# Patient Record
Sex: FEMALE | Race: WHITE | NOT HISPANIC OR LATINO | Employment: OTHER | URBAN - METROPOLITAN AREA
[De-identification: names, ages, dates, MRNs, and addresses within clinical notes are randomized per-mention and may not be internally consistent; named-entity substitution may affect disease eponyms.]

---

## 2017-09-21 ENCOUNTER — OFFICE VISIT (OUTPATIENT)
Dept: NEPHROLOGY | Facility: CLINIC | Age: 62
End: 2017-09-21
Payer: MEDICAID

## 2017-09-21 VITALS
SYSTOLIC BLOOD PRESSURE: 140 MMHG | HEART RATE: 77 BPM | OXYGEN SATURATION: 83 % | DIASTOLIC BLOOD PRESSURE: 94 MMHG | HEIGHT: 65 IN | WEIGHT: 108 LBS | BODY MASS INDEX: 17.99 KG/M2

## 2017-09-21 DIAGNOSIS — R79.89 ELEVATED SERUM CREATININE: Primary | ICD-10-CM

## 2017-09-21 DIAGNOSIS — E87.5 HYPERKALEMIA: ICD-10-CM

## 2017-09-21 DIAGNOSIS — R79.81: ICD-10-CM

## 2017-09-21 PROCEDURE — 99999 PR PBB SHADOW E&M-EST. PATIENT-LVL III: CPT | Mod: PBBFAC,,, | Performed by: INTERNAL MEDICINE

## 2017-09-21 PROCEDURE — 3008F BODY MASS INDEX DOCD: CPT | Mod: ,,, | Performed by: INTERNAL MEDICINE

## 2017-09-21 PROCEDURE — 99213 OFFICE O/P EST LOW 20 MIN: CPT | Mod: PBBFAC | Performed by: INTERNAL MEDICINE

## 2017-09-21 PROCEDURE — 99205 OFFICE O/P NEW HI 60 MIN: CPT | Mod: S$PBB,,, | Performed by: INTERNAL MEDICINE

## 2017-09-21 NOTE — LETTER
September 21, 2017      Will Monaco MD  [address] [place] [address]           [facility] - Nephrology  [address]  [facility] [address]  Phone: [phone]  Fax: [phone]          Patient: Sonya Heredia   MR Number: [id number]   YOB: 1955   Date of Visit: 9/21/2017       Dear Dr. Will Monaco:    Thank you for referring Sonya Heredia to me for evaluation. Attached you will find relevant portions of my assessment and plan of care.    If you have questions, please do not hesitate to call me. I look forward to following Sonya Heredia along with you.    Sincerely,    Hossein Gregory MD    Enclosure  CC:  No Recipients    If you would like to receive this communication electronically, please contact [email or web address] or [phone] to request more information on [employer] access.    For providers and/or their staff who would like to refer a patient to [facility], please contact us through our one-stop-shop provider referral line, [facility], at [phone].    If you feel you have received this communication in error or would no longer like to receive these types of communications, please e-mail [email or web address]

## 2017-09-21 NOTE — PROGRESS NOTES
New Consultation Report  Nephrology      Consult Requested By: Dr. Monaco, PCP  Reason for Consult: CKD    History of Present Illness:  Patient is a 62 y.o. female presents for a new consultation for evaluation of CKD. She has a PMH s/f COPD, HPL and HTN. No hx of diabetes. According to [facility] records, her kidney function was normal 3 years ago. During recent visits with her PCP over the last 4 months, she has had numerous lab reports revealing a serum Cr fluctuating between 1.0 and 1.5 mg/dL. She has also been found to have hyperkalemia and high total CO2. She denies edema, dark urine, foamy urine or urinary disturbance. She has chronic cough and chronic dyspnea from her COPD. She admits taking Aleve and Goody powders for pain.     The patient denies taking new antibiotics, recreational drugs, recent episode of dehydration, diarrhea, nausea or vomiting, acute illness, hospitalization or exposure to IV radiocontrast.     Past Medical History:   Diagnosis Date    Bronchitis     COPD (chronic obstructive pulmonary disease)     Depression     Hyperlipidemia     Hypertension          Current Outpatient Prescriptions:     alprazolam (XANAX) 0.5 MG tablet, Take 0.5 mg by mouth 3 (three) times daily., Disp: , Rfl:     aspirin (ECOTRIN) 325 MG EC tablet, Take 325 mg by mouth once daily., Disp: , Rfl:     escitalopram oxalate (LEXAPRO) 20 MG tablet, Take 20 mg by mouth once daily., Disp: , Rfl:     fluoxetine (PROZAC) 40 MG capsule, Take 40 mg by mouth once daily., Disp: , Rfl:     hydrocodone-acetaminophen 5-325mg (NORCO) 5-325 mg per tablet, Take 1 tablet by mouth every 8 (eight) hours as needed for Pain., Disp: 6 tablet, Rfl: 0    lorazepam (ATIVAN) 1 MG tablet, Take 1 tablet (1 mg total) by mouth once daily., Disp: 10 tablet, Rfl: 0    omeprazole (PRILOSEC) 40 MG capsule, Take 40 mg by mouth once daily., Disp: , Rfl:     pravastatin (PRAVACHOL) 40 MG tablet, Take 40 mg by mouth once daily., Disp: , Rfl:      Review of patient's allergies indicates:  No Known Allergies     Past Surgical History:   Procedure Laterality Date    CHOLECYSTECTOMY      OVARIAN CYST REMOVAL       No family history on file.  Social History   Substance Use Topics    Smoking status: Current Every Day Smoker     Types: Cigarettes    Smokeless tobacco: Not on file    Alcohol use Yes       Review of Systems   Constitutional: Negative.    HENT: Negative.    Eyes: Negative.    Respiratory: Positive for cough, shortness of breath and wheezing.    Cardiovascular: Negative.    Gastrointestinal: Negative.    Genitourinary: Negative.    Musculoskeletal: Negative.    Skin: Negative.    Neurological: Negative.    All other systems reviewed and are negative.      Vitals:    09/21/17 0947   BP: (!) 140/94   Pulse: 77       PHYSICAL EXAMINATION:  General: no distress, well nourished  Skin: color, texture, turgor normal. No rash or lesions  HEENT: Head: normocephalic. Ear/Nose: normal. Eyes: reactive pupils, normal conjunctiva. Oral mucosa moist, no ulcers. Throat: no erythema.  Neck: supple, symmetrical, trachea midline, no JVD, no carotid bruit  Lungs: clear to auscultation bilaterally and normal respiratory effort, distant.   Cardiovascular: Heart: regular rate and rhythm, S1, S2 normal, no murmur, rub or gallop. Pulses: 2+ and symmetric.  Abdomen: bowel sounds present, no abdominal bruit, soft, non-tender non-distented; no masses, incisional scar   Musculoskeletal: no pitting edema in lower extremities, no clubbing or cyanosis  Lymph Nodes: No cervical or supraclavicular adenopathy  Neurologic: AAOx3, normal strength and tone. No focal deficit. No asterixis.       LABORATORY DATA:  Lab Results   Component Value Date    CREATININE 0.7 11/01/2014     No urine studies available    Lab Results   Component Value Date     11/01/2014    K 3.7 11/01/2014    CO2 24 11/01/2014       last PTH   Lab Results   Component Value Date    CALCIUM 8.0 (L)  11/01/2014    PHOS 4.4 10/29/2014       Lab Results   Component Value Date    HGB 11.5 (L) 11/01/2014        Lab Results   Component Value Date    HGBA1C 5.0 10/06/2011       Lab Results   Component Value Date    LDLCALC 107.8 10/06/2011         IMPRESSION / RECOMMENDATIONS:     1. Elevated serum creatinine  Etiology undetermined. CKD vs intermittent JOSHUA from ACEI or NSAID use. May have CKD from HTN nephropathy or renovascular dz given smoking hx. BP is marginally elevated off ACEI. Will reassess after w/u is completed. Will investigate further with following w/u:  Renal US Retroperitoneal Complete + Dopplers    Comprehensive metabolic panel    Uric acid    Urinalysis    Protein / creatinine ratio, urine    CBC auto differential   2. Serum carbon dioxide increased  Likely due to chronic respiratory acidosis from COPD   3. Hyperkalemia  Improved after discontinuation of ACEI.       SUMMARY OF PLAN:  1. Stop all NSAIDs  2. Stay off ACEi  3. Increase fluid intake  4. Renal US + Renal Dopplers  5. UA + UPCR  6. Monitor home BP  7. RTC in 3 mo

## 2018-01-17 ENCOUNTER — NURSE TRIAGE (OUTPATIENT)
Dept: ADMINISTRATIVE | Facility: CLINIC | Age: 63
End: 2018-01-17

## 2018-01-18 NOTE — TELEPHONE ENCOUNTER
"  Reason for Disposition   Health Information question, no triage required and triager able to answer question    Answer Assessment - Initial Assessment Questions  1. REASON FOR CALL or QUESTION: "What is your reason for calling today?" or "How can I best help you?" or "What question do you have that I can help answer?"      Pt's daughter called very concerned because pt has very red swollen feet x 1 week, and family wants her to go to the Ed, but she is refusing.  Also states mother has some sort of blockage in her blood vessels and some problem with her kidneys.    Protocols used: ST INFORMATION ONLY CALL-A-AH    "

## 2018-02-05 ENCOUNTER — HOSPITAL ENCOUNTER (INPATIENT)
Facility: HOSPITAL | Age: 63
LOS: 3 days | Discharge: HOME | DRG: 291 | End: 2018-02-08
Attending: EMERGENCY MEDICINE | Admitting: HOSPITALIST
Payer: MEDICAID

## 2018-02-05 DIAGNOSIS — J96.01 ACUTE RESPIRATORY FAILURE WITH HYPOXIA: ICD-10-CM

## 2018-02-05 DIAGNOSIS — I50.9 NEW ONSET OF CONGESTIVE HEART FAILURE: Primary | ICD-10-CM

## 2018-02-05 DIAGNOSIS — I50.9 HEART FAILURE: ICD-10-CM

## 2018-02-05 DIAGNOSIS — R09.02 HYPOXIA: ICD-10-CM

## 2018-02-05 DIAGNOSIS — J44.1 COPD WITH ACUTE EXACERBATION: ICD-10-CM

## 2018-02-05 DIAGNOSIS — I10 HTN (HYPERTENSION): ICD-10-CM

## 2018-02-05 DIAGNOSIS — R23.0 PERIPHERAL CYANOSIS: ICD-10-CM

## 2018-02-05 PROBLEM — L97.921: Status: ACTIVE | Noted: 2018-02-05

## 2018-02-05 PROBLEM — D58.2 ELEVATED HEMOGLOBIN: Status: ACTIVE | Noted: 2018-02-05

## 2018-02-05 PROBLEM — Z72.0 NICOTINE ABUSE: Status: ACTIVE | Noted: 2018-02-05

## 2018-02-05 PROBLEM — F41.9 ANXIETY: Status: ACTIVE | Noted: 2018-02-05

## 2018-02-05 LAB
ALBUMIN SERPL BCP-MCNC: 3 G/DL
ALP SERPL-CCNC: 76 U/L
ALT SERPL W/O P-5'-P-CCNC: 16 U/L
ANION GAP SERPL CALC-SCNC: 8 MMOL/L
AST SERPL-CCNC: 25 U/L
BASOPHILS # BLD AUTO: 0.05 K/UL
BASOPHILS NFR BLD: 0.8 %
BILIRUB SERPL-MCNC: 0.7 MG/DL
BNP SERPL-MCNC: 1391 PG/ML
BUN SERPL-MCNC: 21 MG/DL
CALCIUM SERPL-MCNC: 9 MG/DL
CHLORIDE SERPL-SCNC: 93 MMOL/L
CO2 SERPL-SCNC: 33 MMOL/L
CREAT SERPL-MCNC: 0.8 MG/DL
DIFFERENTIAL METHOD: ABNORMAL
EOSINOPHIL # BLD AUTO: 0.1 K/UL
EOSINOPHIL NFR BLD: 0.9 %
ERYTHROCYTE [DISTWIDTH] IN BLOOD BY AUTOMATED COUNT: 20.1 %
EST. GFR  (AFRICAN AMERICAN): >60 ML/MIN/1.73 M^2
EST. GFR  (NON AFRICAN AMERICAN): >60 ML/MIN/1.73 M^2
GLUCOSE SERPL-MCNC: 105 MG/DL
HCT VFR BLD AUTO: 54.4 %
HGB BLD-MCNC: 16.6 G/DL
IMM GRANULOCYTES # BLD AUTO: 0.01 K/UL
IMM GRANULOCYTES NFR BLD AUTO: 0.2 %
INR PPP: 1.2
LYMPHOCYTES # BLD AUTO: 0.8 K/UL
LYMPHOCYTES NFR BLD: 11.8 %
MCH RBC QN AUTO: 25.7 PG
MCHC RBC AUTO-ENTMCNC: 30.5 G/DL
MCV RBC AUTO: 84 FL
MONOCYTES # BLD AUTO: 0.6 K/UL
MONOCYTES NFR BLD: 8.6 %
NEUTROPHILS # BLD AUTO: 5.1 K/UL
NEUTROPHILS NFR BLD: 77.7 %
NRBC BLD-RTO: 0 /100 WBC
PLATELET # BLD AUTO: 173 K/UL
PMV BLD AUTO: 9.7 FL
POTASSIUM SERPL-SCNC: 4.5 MMOL/L
PROT SERPL-MCNC: 6.5 G/DL
PROTHROMBIN TIME: 12 SEC
RBC # BLD AUTO: 6.46 M/UL
SODIUM SERPL-SCNC: 134 MMOL/L
TROPONIN I SERPL DL<=0.01 NG/ML-MCNC: 0.06 NG/ML
WBC # BLD AUTO: 6.61 K/UL

## 2018-02-05 PROCEDURE — 25000003 PHARM REV CODE 250: Performed by: HOSPITALIST

## 2018-02-05 PROCEDURE — 99222 1ST HOSP IP/OBS MODERATE 55: CPT | Mod: ,,, | Performed by: HOSPITALIST

## 2018-02-05 PROCEDURE — 25000003 PHARM REV CODE 250

## 2018-02-05 PROCEDURE — S4991 NICOTINE PATCH NONLEGEND: HCPCS

## 2018-02-05 PROCEDURE — A4216 STERILE WATER/SALINE, 10 ML: HCPCS | Performed by: HOSPITALIST

## 2018-02-05 PROCEDURE — 93005 ELECTROCARDIOGRAM TRACING: CPT

## 2018-02-05 PROCEDURE — 99285 EMERGENCY DEPT VISIT HI MDM: CPT | Mod: 25

## 2018-02-05 PROCEDURE — 99284 EMERGENCY DEPT VISIT MOD MDM: CPT | Mod: ,,, | Performed by: EMERGENCY MEDICINE

## 2018-02-05 PROCEDURE — 63600175 PHARM REV CODE 636 W HCPCS

## 2018-02-05 PROCEDURE — 99900035 HC TECH TIME PER 15 MIN (STAT)

## 2018-02-05 PROCEDURE — 93010 ELECTROCARDIOGRAM REPORT: CPT | Mod: ,,, | Performed by: INTERNAL MEDICINE

## 2018-02-05 PROCEDURE — 83880 ASSAY OF NATRIURETIC PEPTIDE: CPT

## 2018-02-05 PROCEDURE — 85025 COMPLETE CBC W/AUTO DIFF WBC: CPT

## 2018-02-05 PROCEDURE — 80053 COMPREHEN METABOLIC PANEL: CPT

## 2018-02-05 PROCEDURE — 84484 ASSAY OF TROPONIN QUANT: CPT

## 2018-02-05 PROCEDURE — 20600001 HC STEP DOWN PRIVATE ROOM

## 2018-02-05 PROCEDURE — 85610 PROTHROMBIN TIME: CPT

## 2018-02-05 PROCEDURE — 96374 THER/PROPH/DIAG INJ IV PUSH: CPT

## 2018-02-05 RX ORDER — ESCITALOPRAM OXALATE 20 MG/1
20 TABLET ORAL DAILY
Status: DISCONTINUED | OUTPATIENT
Start: 2018-02-06 | End: 2018-02-08 | Stop reason: HOSPADM

## 2018-02-05 RX ORDER — IPRATROPIUM BROMIDE AND ALBUTEROL SULFATE 2.5; .5 MG/3ML; MG/3ML
3 SOLUTION RESPIRATORY (INHALATION)
Status: DISPENSED | OUTPATIENT
Start: 2018-02-05 | End: 2018-02-05

## 2018-02-05 RX ORDER — FUROSEMIDE 10 MG/ML
20 INJECTION INTRAMUSCULAR; INTRAVENOUS 2 TIMES DAILY
Status: DISCONTINUED | OUTPATIENT
Start: 2018-02-06 | End: 2018-02-07

## 2018-02-05 RX ORDER — ALBUTEROL SULFATE 0.83 MG/ML
2.5 SOLUTION RESPIRATORY (INHALATION) EVERY 6 HOURS PRN
COMMUNITY

## 2018-02-05 RX ORDER — PREDNISONE 20 MG/1
60 TABLET ORAL
Status: COMPLETED | OUTPATIENT
Start: 2018-02-05 | End: 2018-02-05

## 2018-02-05 RX ORDER — AMITRIPTYLINE HYDROCHLORIDE 25 MG/1
25 TABLET, FILM COATED ORAL NIGHTLY PRN
COMMUNITY
End: 2019-10-20

## 2018-02-05 RX ORDER — ALBUTEROL SULFATE 90 UG/1
2 AEROSOL, METERED RESPIRATORY (INHALATION) EVERY 6 HOURS PRN
COMMUNITY

## 2018-02-05 RX ORDER — FUROSEMIDE 10 MG/ML
20 INJECTION INTRAMUSCULAR; INTRAVENOUS
Status: COMPLETED | OUTPATIENT
Start: 2018-02-05 | End: 2018-02-05

## 2018-02-05 RX ORDER — SODIUM CHLORIDE 0.9 % (FLUSH) 0.9 %
3 SYRINGE (ML) INJECTION EVERY 8 HOURS
Status: DISCONTINUED | OUTPATIENT
Start: 2018-02-05 | End: 2018-02-08 | Stop reason: HOSPADM

## 2018-02-05 RX ORDER — ENOXAPARIN SODIUM 100 MG/ML
40 INJECTION SUBCUTANEOUS EVERY 24 HOURS
Status: DISCONTINUED | OUTPATIENT
Start: 2018-02-05 | End: 2018-02-08 | Stop reason: HOSPADM

## 2018-02-05 RX ORDER — IBUPROFEN 200 MG
1 TABLET ORAL
Status: COMPLETED | OUTPATIENT
Start: 2018-02-05 | End: 2018-02-06

## 2018-02-05 RX ORDER — BUTALBITAL, ACETAMINOPHEN AND CAFFEINE 300; 40; 50 MG/1; MG/1; MG/1
CAPSULE ORAL
COMMUNITY
End: 2018-02-05

## 2018-02-05 RX ORDER — AMITRIPTYLINE HYDROCHLORIDE 25 MG/1
25 TABLET, FILM COATED ORAL NIGHTLY PRN
Status: DISCONTINUED | OUTPATIENT
Start: 2018-02-05 | End: 2018-02-08 | Stop reason: HOSPADM

## 2018-02-05 RX ADMIN — PREDNISONE 60 MG: 20 TABLET ORAL at 05:02

## 2018-02-05 RX ADMIN — FUROSEMIDE 20 MG: 10 INJECTION, SOLUTION INTRAMUSCULAR; INTRAVENOUS at 06:02

## 2018-02-05 RX ADMIN — NICOTINE 1 PATCH: 21 PATCH, EXTENDED RELEASE TRANSDERMAL at 06:02

## 2018-02-05 RX ADMIN — ENOXAPARIN SODIUM 40 MG: 100 INJECTION SUBCUTANEOUS at 10:02

## 2018-02-05 RX ADMIN — Medication 3 ML: at 10:02

## 2018-02-05 NOTE — ED NOTES
Appearance:  Pt awake, alert & oriented to person, place & time.    Skin:  Skin warm, dry & intact.  Mucous membranes moist.  Skin turgor normal.  Respiratory:  Respirations shallow-pt denies SOB.   Neurologic:  Pt moving all extremities without difficulty.  Sensation intact.     Peripheral Vascular:  Bilateral lower extremity edema-3+ pitting.  (+) pulses via doppler  Abdomen:  Abdomen soft, non-tender to palpation.

## 2018-02-05 NOTE — ED PROVIDER NOTES
Encounter Date: 2/5/2018    SCRIBE #1 NOTE: I, Nirali Ingram, am scribing for, and in the presence of,  Dr. Chinchilla. I have scribed the following portions of the note - the Resident attestation.       History     Chief Complaint   Patient presents with    Leg Pain     both legs painful and swollen    Shortness of Breath     Ms. Heredia is a 63 yo woman with PMHx of COPD, little else known as care is primarily at  and patient has poor insight, who presents to the ED from her PCP's clinic due to worsening edema of her bilateral LE. However, on presentation patient was found to have O2 sats in the 60s on room air. Patient denied SOB. Sats improved to 100% on non-rebreather. Patient states her bilateral LE edema began 1 month ago. Associated with red-purple discoloration of her toes. Left foot with 2 areas of ulceration with drainage of clear fluid. Edema has progressively worsened to extend up to her knees. Denies symptoms of claudication, orthopnea, PND. Currently smokes 1/2ppd.          Review of patient's allergies indicates:  No Known Allergies  Past Medical History:   Diagnosis Date    Bronchitis     COPD (chronic obstructive pulmonary disease)     Depression     Hyperlipidemia     Hypertension      Past Surgical History:   Procedure Laterality Date    CHOLECYSTECTOMY      OVARIAN CYST REMOVAL       History reviewed. No pertinent family history.  Social History   Substance Use Topics    Smoking status: Current Every Day Smoker     Types: Cigarettes    Smokeless tobacco: Not on file    Alcohol use Yes     Review of Systems   Constitutional: Negative for fever.   HENT: Negative for sore throat.    Respiratory: Positive for shortness of breath. Negative for cough.    Cardiovascular: Positive for leg swelling. Negative for chest pain.   Gastrointestinal: Negative for nausea.   Genitourinary: Negative for dysuria.   Musculoskeletal: Negative for back pain.   Skin: Negative for rash.   Neurological:  Negative for weakness.   Hematological: Does not bruise/bleed easily.       Physical Exam     Initial Vitals [02/05/18 1635]   BP Pulse Resp Temp SpO2   (!) 173/99 (!) 115 (!) 42 98.2 °F (36.8 °C) (!) 66 %      MAP       123.67         Physical Exam    Nursing note and vitals reviewed.  Constitutional: She appears well-developed and well-nourished. She is not diaphoretic. No distress.   HENT:   Head: Normocephalic and atraumatic.   Eyes: EOM are normal. Pupils are equal, round, and reactive to light. Right eye exhibits no discharge. Left eye exhibits no discharge. No scleral icterus.   Neck: Normal range of motion. Neck supple. No JVD present.   Cardiovascular: Regular rhythm, normal heart sounds and intact distal pulses. Tachycardia present.  Exam reveals no gallop and no friction rub.    No murmur heard.  Pulmonary/Chest: She has wheezes. She has rhonchi.   Tachypnic, hypoxic, faint subcostal retractions, tight wheezing throughout, speaking 4-5 word sentences on RA   Abdominal: Soft. Bowel sounds are normal. She exhibits no distension and no mass. There is no tenderness. There is no rebound and no guarding.   Musculoskeletal: Normal range of motion. She exhibits no edema or tenderness.   Lymphadenopathy:     She has no cervical adenopathy.   Neurological: She is alert and oriented to person, place, and time. She has normal strength. No sensory deficit.   Skin: Skin is warm and dry. Capillary refill takes more than 3 seconds.   Peripheral cyanosis symmetric throughout toes with sluggish cap refill, brisk cap refill on fingers, dopplerable DP signals bilat   Psychiatric: She has a normal mood and affect.         ED Course   Procedures  Labs Reviewed   CBC W/ AUTO DIFFERENTIAL - Abnormal; Notable for the following:        Result Value    RBC 6.46 (*)     Hemoglobin 16.6 (*)     Hematocrit 54.4 (*)     MCH 25.7 (*)     MCHC 30.5 (*)     RDW 20.1 (*)     Lymph # 0.8 (*)     Gran% 77.7 (*)     Lymph% 11.8 (*)     All  other components within normal limits   COMPREHENSIVE METABOLIC PANEL - Abnormal; Notable for the following:     Sodium 134 (*)     Chloride 93 (*)     CO2 33 (*)     BUN, Bld 21 (*)     Albumin 3.0 (*)     All other components within normal limits   TROPONIN I - Abnormal; Notable for the following:     Troponin I 0.061 (*)     All other components within normal limits   B-TYPE NATRIURETIC PEPTIDE - Abnormal; Notable for the following:     BNP 1,391 (*)     All other components within normal limits   PROTIME-INR             Medical Decision Making:   History:   Old Medical Records: I decided to obtain old medical records.  Clinical Tests:   Lab Tests: Ordered and Reviewed  Radiological Study: Ordered and Reviewed  ED Management:  O2 improved to 100% on non-rebreather and nebs. Patient transitioned to venti mask, and then to NC.     CBC with elevated crit, likely 2/2 to chronic hypoxia. PT/INR WNL. CMP remarkable for slightly elevated CO2, likely chronic due to COPD.    Trop mildly elevated. BNP markedly elevated at 1400. CXR with R pleural effusion and vascular congestion (personal interpretation, Radiology read pending).    6:48 PM  Patient maintaining O2 at 100% on 2L per NC. Given furosemide 20 mg IV.    Patient admitted to hospital medicine Team C, Dr. Alba.            Scribe Attestation:   Scribe #1: I performed the above scribed service and the documentation accurately describes the services I performed. I attest to the accuracy of the note.    Attending Attestation:   Physician Attestation Statement for Resident:  As the supervising MD   Physician Attestation Statement: I have personally seen and examined this patient.   I agree with the above history. -: Acute COPD exacerbation, received empiric treatment with duonebs and steroids with improvement in symptoms and hypoxia. New onset heart failure. Will diuresis.  Cyanotic lower extremities, but symptoms are inconsistent with claudication. ABIs ordered.  Medicine admission.   As the supervising MD I agree with the above PE.    As the supervising MD I agree with the above treatment, course, plan, and disposition.                    ED Course      Clinical Impression:   The primary encounter diagnosis was New onset of congestive heart failure. Diagnoses of Hypoxia, COPD with acute exacerbation, and Peripheral cyanosis were also pertinent to this visit.                           Milton Lugo MD  Resident  02/06/18 0924

## 2018-02-05 NOTE — ED TRIAGE NOTES
Pt states she was sent from the clinic for edema and discoration to both feet.  Pt states she has had these symptoms for approx 1-2 months.  Pt was brought into room 4-O2 sat was 60%.  Pt denies any worse than normal SOB.

## 2018-02-06 PROBLEM — R23.9 ALTERATION IN SKIN INTEGRITY: Status: ACTIVE | Noted: 2018-02-06

## 2018-02-06 LAB
ALBUMIN SERPL BCP-MCNC: 2.8 G/DL
ALLENS TEST: ABNORMAL
ALP SERPL-CCNC: 66 U/L
ALT SERPL W/O P-5'-P-CCNC: 13 U/L
ANION GAP SERPL CALC-SCNC: 11 MMOL/L
AST SERPL-CCNC: 25 U/L
BASOPHILS # BLD AUTO: 0.02 K/UL
BASOPHILS NFR BLD: 0.4 %
BILIRUB SERPL-MCNC: 0.4 MG/DL
BUN SERPL-MCNC: 21 MG/DL
CALCIUM SERPL-MCNC: 8.4 MG/DL
CHLORIDE SERPL-SCNC: 91 MMOL/L
CO2 SERPL-SCNC: 35 MMOL/L
CREAT SERPL-MCNC: 1 MG/DL
DELSYS: ABNORMAL
DIFFERENTIAL METHOD: ABNORMAL
EOSINOPHIL # BLD AUTO: 0 K/UL
EOSINOPHIL NFR BLD: 0 %
ERYTHROCYTE [DISTWIDTH] IN BLOOD BY AUTOMATED COUNT: 19.9 %
EST. GFR  (AFRICAN AMERICAN): >60 ML/MIN/1.73 M^2
EST. GFR  (NON AFRICAN AMERICAN): >60 ML/MIN/1.73 M^2
ESTIMATED PA SYSTOLIC PRESSURE: 67.42
GLOBAL PERICARDIAL EFFUSION: ABNORMAL
GLUCOSE SERPL-MCNC: 130 MG/DL
HCO3 UR-SCNC: 39.4 MMOL/L (ref 24–28)
HCT VFR BLD AUTO: 49.5 %
HGB BLD-MCNC: 14.4 G/DL
IMM GRANULOCYTES # BLD AUTO: 0.05 K/UL
IMM GRANULOCYTES NFR BLD AUTO: 0.9 %
LYMPHOCYTES # BLD AUTO: 0.3 K/UL
LYMPHOCYTES NFR BLD: 5.1 %
MCH RBC QN AUTO: 25.6 PG
MCHC RBC AUTO-ENTMCNC: 29.1 G/DL
MCV RBC AUTO: 88 FL
MONOCYTES # BLD AUTO: 0.3 K/UL
MONOCYTES NFR BLD: 4.6 %
NEUTROPHILS # BLD AUTO: 5.1 K/UL
NEUTROPHILS NFR BLD: 89 %
NRBC BLD-RTO: 0 /100 WBC
PCO2 BLDA: 89.5 MMHG (ref 35–45)
PH SMN: 7.25 [PH] (ref 7.35–7.45)
PLATELET # BLD AUTO: 156 K/UL
PMV BLD AUTO: 9.6 FL
PO2 BLDA: 70 MMHG (ref 80–100)
POC BE: 12 MMOL/L
POC SATURATED O2: 89 % (ref 95–100)
POC TCO2: 42 MMOL/L (ref 23–27)
POTASSIUM SERPL-SCNC: 5 MMOL/L
PROCALCITONIN SERPL IA-MCNC: 0.11 NG/ML
PROT SERPL-MCNC: 5.9 G/DL
RBC # BLD AUTO: 5.62 M/UL
RETIRED EF AND QEF - SEE NOTES: 65 (ref 55–65)
SAMPLE: ABNORMAL
SITE: ABNORMAL
SODIUM SERPL-SCNC: 137 MMOL/L
TRICUSPID VALVE REGURGITATION: ABNORMAL
WBC # BLD AUTO: 5.68 K/UL

## 2018-02-06 PROCEDURE — 27000190 HC CPAP FULL FACE MASK W/VALVE

## 2018-02-06 PROCEDURE — 25000242 PHARM REV CODE 250 ALT 637 W/ HCPCS: Performed by: HOSPITALIST

## 2018-02-06 PROCEDURE — 85025 COMPLETE CBC W/AUTO DIFF WBC: CPT

## 2018-02-06 PROCEDURE — 84145 PROCALCITONIN (PCT): CPT

## 2018-02-06 PROCEDURE — 94761 N-INVAS EAR/PLS OXIMETRY MLT: CPT

## 2018-02-06 PROCEDURE — 63600175 PHARM REV CODE 636 W HCPCS

## 2018-02-06 PROCEDURE — 99233 SBSQ HOSP IP/OBS HIGH 50: CPT | Mod: ,,, | Performed by: HOSPITALIST

## 2018-02-06 PROCEDURE — 93922 UPR/L XTREMITY ART 2 LEVELS: CPT

## 2018-02-06 PROCEDURE — 63600175 PHARM REV CODE 636 W HCPCS: Performed by: HOSPITALIST

## 2018-02-06 PROCEDURE — 93922 UPR/L XTREMITY ART 2 LEVELS: CPT | Mod: 26,,, | Performed by: INTERNAL MEDICINE

## 2018-02-06 PROCEDURE — 20600001 HC STEP DOWN PRIVATE ROOM

## 2018-02-06 PROCEDURE — 94640 AIRWAY INHALATION TREATMENT: CPT

## 2018-02-06 PROCEDURE — 25000003 PHARM REV CODE 250: Performed by: HOSPITALIST

## 2018-02-06 PROCEDURE — 36415 COLL VENOUS BLD VENIPUNCTURE: CPT

## 2018-02-06 PROCEDURE — 93306 TTE W/DOPPLER COMPLETE: CPT

## 2018-02-06 PROCEDURE — A4216 STERILE WATER/SALINE, 10 ML: HCPCS | Performed by: HOSPITALIST

## 2018-02-06 PROCEDURE — 27000221 HC OXYGEN, UP TO 24 HOURS

## 2018-02-06 PROCEDURE — 93306 TTE W/DOPPLER COMPLETE: CPT | Mod: 26,,, | Performed by: INTERNAL MEDICINE

## 2018-02-06 PROCEDURE — 80053 COMPREHEN METABOLIC PANEL: CPT

## 2018-02-06 PROCEDURE — 99900035 HC TECH TIME PER 15 MIN (STAT)

## 2018-02-06 RX ORDER — BUTALBITAL, ACETAMINOPHEN AND CAFFEINE 50; 325; 40 MG/1; MG/1; MG/1
1 TABLET ORAL EVERY 4 HOURS PRN
Status: DISCONTINUED | OUTPATIENT
Start: 2018-02-06 | End: 2018-02-08 | Stop reason: HOSPADM

## 2018-02-06 RX ORDER — PREDNISONE 20 MG/1
40 TABLET ORAL DAILY
Status: DISCONTINUED | OUTPATIENT
Start: 2018-02-06 | End: 2018-02-08 | Stop reason: HOSPADM

## 2018-02-06 RX ORDER — IPRATROPIUM BROMIDE AND ALBUTEROL SULFATE 2.5; .5 MG/3ML; MG/3ML
3 SOLUTION RESPIRATORY (INHALATION)
Status: DISCONTINUED | OUTPATIENT
Start: 2018-02-06 | End: 2018-02-08 | Stop reason: HOSPADM

## 2018-02-06 RX ORDER — GUAIFENESIN/DEXTROMETHORPHAN 100-10MG/5
5 SYRUP ORAL EVERY 4 HOURS PRN
Status: DISCONTINUED | OUTPATIENT
Start: 2018-02-06 | End: 2018-02-08 | Stop reason: HOSPADM

## 2018-02-06 RX ADMIN — Medication 3 ML: at 06:02

## 2018-02-06 RX ADMIN — IPRATROPIUM BROMIDE AND ALBUTEROL SULFATE 3 ML: .5; 3 SOLUTION RESPIRATORY (INHALATION) at 12:02

## 2018-02-06 RX ADMIN — AMITRIPTYLINE HYDROCHLORIDE 25 MG: 25 TABLET, FILM COATED ORAL at 10:02

## 2018-02-06 RX ADMIN — BECLOMETHASONE DIPROPIONATE 2 PUFF: 40 AEROSOL, METERED RESPIRATORY (INHALATION) at 10:02

## 2018-02-06 RX ADMIN — FUROSEMIDE 20 MG: 10 INJECTION, SOLUTION INTRAMUSCULAR; INTRAVENOUS at 05:02

## 2018-02-06 RX ADMIN — ESCITALOPRAM 20 MG: 20 TABLET, FILM COATED ORAL at 09:02

## 2018-02-06 RX ADMIN — FUROSEMIDE 20 MG: 10 INJECTION, SOLUTION INTRAMUSCULAR; INTRAVENOUS at 09:02

## 2018-02-06 RX ADMIN — Medication 3 ML: at 02:02

## 2018-02-06 RX ADMIN — ENOXAPARIN SODIUM 40 MG: 100 INJECTION SUBCUTANEOUS at 05:02

## 2018-02-06 RX ADMIN — BUTALBITAL, ACETAMINOPHEN AND CAFFEINE 1 TABLET: 50; 325; 40 TABLET ORAL at 10:02

## 2018-02-06 RX ADMIN — PREDNISONE 40 MG: 20 TABLET ORAL at 10:02

## 2018-02-06 RX ADMIN — IPRATROPIUM BROMIDE AND ALBUTEROL SULFATE 3 ML: .5; 3 SOLUTION RESPIRATORY (INHALATION) at 07:02

## 2018-02-06 RX ADMIN — Medication 3 ML: at 10:02

## 2018-02-06 RX ADMIN — BUTALBITAL, ACETAMINOPHEN AND CAFFEINE 1 TABLET: 50; 325; 40 TABLET ORAL at 12:02

## 2018-02-06 RX ADMIN — BUTALBITAL, ACETAMINOPHEN AND CAFFEINE 1 TABLET: 50; 325; 40 TABLET ORAL at 02:02

## 2018-02-06 NOTE — PLAN OF CARE
02/06/18 1041   Discharge Assessment   Assessment Type Discharge Planning Assessment   Confirmed/corrected address and phone number on facesheet? Yes   Assessment information obtained from? Patient;Medical Record   Expected Length of Stay (days) 3   Communicated expected length of stay with patient/caregiver yes   Prior to hospitilization cognitive status: Alert/Oriented   Prior to hospitalization functional status: Independent   Current cognitive status: Alert/Oriented   Current Functional Status: Independent   Lives With alone   Able to Return to Prior Arrangements yes   Is patient able to care for self after discharge? Yes   Patient's perception of discharge disposition home or selfcare   Readmission Within The Last 30 Days no previous admission in last 30 days   Patient currently being followed by outpatient case management? No   Patient currently receives any other outside agency services? No   Equipment Currently Used at Home nebulizer;walker, rolling   Do you have any problems affording any of your prescribed medications? No   Is the patient taking medications as prescribed? yes   Does the patient have transportation home? Yes   Transportation Available family or friend will provide   Does the patient receive services at the Coumadin Clinic? No   Discharge Plan A Home   Patient/Family In Agreement With Plan yes   Admitted with new onset CHF. Lives alone but son checks on her often. Independent in her ADLs. Plan is to DC home. No DC needs identified.

## 2018-02-06 NOTE — PROGRESS NOTES
Pt. placed on bipap per Dr. Manrique's orders and ABG results; ipap 10 cmH2O epap 5 cmH2O; will continue to monitor.

## 2018-02-06 NOTE — NURSING
Pt admitted to . Pt arrived to floor with telemtry from ED via stretcher per pt escort. Telemetry transferred to CSU monitor.  VSS. NAD. No complaints at this time.  Plan of care initiated. CSU orders imitated.  Bed in lowest position, SR up x2, call bell in reach. Will continue to monitor pt.

## 2018-02-06 NOTE — PLAN OF CARE
Problem: Patient Care Overview  Goal: Plan of Care Review  Pt remains free of injury and falls, up to BSC with assistance, PO Fioricet given for headache, pt reassessed, denies headache.  Pt left floor for  Echo and CAR US Ankle Brachial Indices Ext, returned safely.  Pt lethargic at beginning of shift, ABG done, CO2 89. Pt was placed on Bi pap continuous,  bi pap removed for meals. Plan of care reviewed with pt and daughter.  Verbalize understanding.

## 2018-02-06 NOTE — PROGRESS NOTES
Patient escorted off unit via stretcher for ECHO. Patient transferred to stretcher with Minimal assistance. No acute distress noted. Patient denies pain. Left AC 20g peripheral IV intact. No redness or swelling noted. Transported on telemetry. Monitor room notified. Awaiting patient's return.     1651 pt returned back to unit

## 2018-02-06 NOTE — PROGRESS NOTES
This note also relates to the following rows which could not be included:  SpO2 - Cannot attach notes to unvalidated device data  Pulse - Cannot attach notes to unvalidated device data  Resp - Cannot attach notes to unvalidated device data    Treatment did not show up in pixis. The RRT did try to contact the nurse and doctor but could not locate them. RRT went by pt room in ED to do assesement. PT had dimished wheezes on right side and Pt said she was not short of breath

## 2018-02-06 NOTE — PLAN OF CARE
Problem: Patient Care Overview  Goal: Plan of Care Review  Outcome: Ongoing (interventions implemented as appropriate)  Patient remains free from falls and injuries through out shift. Patient AAO and VSS. Patient denies chest pain and SOB. Patient given Lasix 20mg in ED per MD order. Plan of care reviewed with patient. Patient verbalizes understanding of plan.  Will continue to monitor.

## 2018-02-06 NOTE — H&P
[facility]ist History and Physical -Note    Admitting Team: IM-C  Attending Physician: Dr Alba      Date of Admit: 2/5/2018    Chief Complaint     Leg Pain (both legs painful and swollen) and Shortness of Breath   for 3 weeks    Subjective:      History of Present Illness:  Sonya Heredia is a 62 y.o.  female who  has a past medical history of Bronchitis; COPD (chronic obstructive pulmonary disease); Depression; Hyperlipidemia; and Hypertension.. The patient presented to [facility] on 2/5/2018 with a primary complaint of Leg Pain (both legs painful and swollen) and Shortness of Breath      The patient was in their usual state of health until 3 weeks ago when she started having lower extremity swelling. She has never had this happen in the past. She denies trauma to legs, but states that initially she had blisters on the LLE that were fluid filled from swelling and popped and became scabs about 2 weeks ago. She states she has a generalized shortness of breath and has used her albuterol inhaler daily for the past few weeks (as opposed to once a month or so which was her previous baseline). She denies sleeping with extra pillows at night, chest palpitations, chest pain, weight gain. She denies numbness/tingling to lower extremities, endorses scant LE hair, denies pain, claudication, loss of temperature/touch sensation. Denies feeling like feet are normally cold at baseline.  She went to her PCP today for the leg swelling and her sats were reportedly 60s-70s% and sent to the ED for evaluation. She does not use oxygen at home. She has a COPD history. She smokes 1/2 ppd to 1 ppd since age 16. She is motivated to quit smoking now and wants to try nicotine patches. She denies recent hospital stays for COPD exacerbations or recent steroid use for COPD.  She reports good urination since being given lasix in ED.   Med history: COPD, nicotine abuse, HTN (not on meds), HLD  Home meds: elavil 25 qHS, lexapro,  ventolin PRN, albuterol nebs, Qbar 80    Past Medical History:  Past Medical History:   Diagnosis Date    Bronchitis     COPD (chronic obstructive pulmonary disease)     Depression     Hyperlipidemia     Hypertension        Past Surgical History:  Past Surgical History:   Procedure Laterality Date    CHOLECYSTECTOMY      OVARIAN CYST REMOVAL         Allergies:  Review of patient's allergies indicates:  No Known Allergies    Home Medications:  Prior to Admission medications    Medication Sig Start Date End Date Taking? Authorizing Provider   albuterol (PROVENTIL) 2.5 mg /3 mL (0.083 %) nebulizer solution Take 2.5 mg by nebulization every 6 (six) hours as needed for Wheezing. Rescue   Yes Historical Provider, MD   albuterol (VENTOLIN HFA) 90 mcg/actuation inhaler Inhale 2 puffs into the lungs every 6 (six) hours as needed for Wheezing. Rescue   Yes Historical Provider, MD   amitriptyline (ELAVIL) 25 MG tablet Take 25 mg by mouth nightly as needed for Insomnia.   Yes Historical Provider, MD   beclomethasone (QVAR) 80 mcg/actuation Aero Inhale 1 puff into the lungs 2 (two) times daily. Controller   Yes Historical Provider, MD   butalbital-acetaminophen-caff (FIORICET) -40 mg Cap Take by mouth.  2/5/18 Yes Historical Provider, MD   escitalopram oxalate (LEXAPRO) 20 MG tablet Take 20 mg by mouth once daily.    Historical Provider, MD   alprazolam (XANAX) 0.5 MG tablet Take 0.5 mg by mouth 3 (three) times daily.  2/5/18  Historical Provider, MD   aspirin (ECOTRIN) 325 MG EC tablet Take 325 mg by mouth once daily.  2/5/18  Historical Provider, MD   fluoxetine (PROZAC) 40 MG capsule Take 40 mg by mouth once daily.  2/5/18  Historical Provider, MD   hydrocodone-acetaminophen 5-325mg (NORCO) 5-325 mg per tablet Take 1 tablet by mouth every 8 (eight) hours as needed for Pain. 8/9/15 2/5/18  Jose Carlos Rosenberg MD   lorazepam (ATIVAN) 1 MG tablet Take 1 tablet (1 mg total) by mouth once daily. 11/1/14 2/5/18  John CRUZ  "MD Jennie   omeprazole (PRILOSEC) 40 MG capsule Take 40 mg by mouth once daily.  18  Historical Provider, MD   pravastatin (PRAVACHOL) 40 MG tablet Take 40 mg by mouth once daily.  18  Historical Provider, MD       Family History:  History reviewed. No pertinent family history.    Social History:  Social History   Substance Use Topics    Smoking status: Current Every Day Smoker     Types: Cigarettes    Smokeless tobacco: Not on file    Alcohol use Yes       Review of Systems:  Constitutional: positive for fatigue, negative for chills, fevers and weight loss  Eyes: negative for irritation and redness  Ears, nose, mouth, throat, and face: negative for nasal congestion, snoring and sore mouth  Respiratory: positive for dyspnea on exertion, negative for cough, sputum and wheezing  Cardiovascular: positive for lower extremity edema, negative for chest pain, claudication, palpitations, palpitations and paroxysmal nocturnal dyspnea  Gastrointestinal: negative for abdominal pain, bright red blood per rectum and change in bowel habits  Genitourinary:negative for decreased stream, dysuria and frequency  Integument/breast: positive for skin color change and skin lesion(s), negative for rash  Hematologic/lymphatic: negative for bleeding and easy bruising  Musculoskeletal:negative for arthralgias, back pain and bone pain  Neurological: negative for coordination problems, dizziness and headaches  Behavioral/Psych: positive for sleep disturbance, negative for behavior problems, decreased appetite and depression All other systems are reviewed and are negative.    Health Maintaince :   Primary Care Physician: Will Monaco MD  I     Objective:   Last 24 Hour Vital Signs:  BP  Min: 140/96  Max: 173/99  Temp  Av.2 °F (36.8 °C)  Min: 98.2 °F (36.8 °C)  Max: 98.2 °F (36.8 °C)  Pulse  Av  Min: 103  Max: 115  Resp  Av.3  Min: 20  Max: 42  SpO2  Av.7 %  Min: 66 %  Max: 100 %  Height  Av' 1" (154.9 cm)  " "Min: 5' 1" (154.9 cm)  Max: 5' 1" (154.9 cm)  Weight  Av.2 kg (135 lb)  Min: 61.2 kg (135 lb)  Max: 61.2 kg (135 lb)  Body mass index is 25.51 kg/m².  No intake/output data recorded.     BP (!) 140/96   Pulse 106   Temp 98.2 °F (36.8 °C) (Oral)   Resp 20   Ht 5' 1" (1.549 m)   Wt 61.2 kg (135 lb)   SpO2 100%   BMI 25.51 kg/m²     Physical Examination:  /76   Pulse 105   Temp 98.2 °F (36.8 °C) (Oral)   Resp (!) 25   Ht 5' 1" (1.549 m)   Wt 61.2 kg (135 lb)   SpO2 98%   BMI 25.51 kg/m²   General appearance: alert, appears stated age and cooperative  Head: Normocephalic, without obvious abnormality, atraumatic  Eyes: conjunctivae/corneas clear. PERRL, EOM's intact. Fundi benign.  Ears: normal TM's and external ear canals both ears  Nose: Nares normal. Septum midline. Mucosa normal. No drainage or sinus tenderness.  Throat: lips, mucosa, and tongue normal; teeth and gums normal  Neck: no adenopathy, no carotid bruit, supple, symmetrical, trachea midline, thyroid not enlarged, symmetric, no tenderness/mass/nodules and JVD elevated 10  Back: symmetric, no curvature. ROM normal. No CVA tenderness.  Lungs: bilateral crackles in lung bases, no wheezing on exam. on 2L NC maintaining sats- speaking in full senences without stopping for SOB  Heart: regular rate and rhythm, S1, S2 normal, no murmur, click, rub or gallop  Abdomen: soft, non-tender; bowel sounds normal; no masses,  no organomegaly  Extremities: bilateral pitting edema to lower extremities to the knees, left lower extremity with 3 small area of vascular type ulcers, scant hair in bilateral lower extremities, toe discoloration diffusely on both lowe extremities, small amoutnf of petechiae around the ankles, pulses 1+ and difficult to feel, legs mildly cold to touch  Pulses: 1+ bilaterally, difficult to palpate  Skin: see ext  Lymph nodes: Cervical, supraclavicular, and axillary nodes normal.  Neurologic: Alert and oriented X 3, normal " strength and tone. Normal symmetric reflexes. Normal coordination and gait      Laboratory:  Most Recent Data:  CBC: Lab Results   Component Value Date    WBC 6.61 02/05/2018    HGB 16.6 (H) 02/05/2018    HCT 54.4 (H) 02/05/2018     02/05/2018    MCV 84 02/05/2018    RDW 20.1 (H) 02/05/2018     BMP: Lab Results   Component Value Date     (L) 02/05/2018    K 4.5 02/05/2018    CL 93 (L) 02/05/2018    CO2 33 (H) 02/05/2018    BUN 21 (H) 02/05/2018    CREATININE 0.8 02/05/2018     02/05/2018    CALCIUM 9.0 02/05/2018    MG 1.9 10/31/2014    PHOS 4.4 10/29/2014     LFTs: Lab Results   Component Value Date    PROT 6.5 02/05/2018    ALBUMIN 3.0 (L) 02/05/2018    BILITOT 0.7 02/05/2018    AST 25 02/05/2018    ALKPHOS 76 02/05/2018    ALT 16 02/05/2018     Coags:   Lab Results   Component Value Date    INR 1.2 02/05/2018     FLP: Lab Results   Component Value Date    CHOL 182 10/06/2011    HDL 48 10/06/2011    LDLCALC 107.8 10/06/2011    TRIG 131 10/06/2011    CHOLHDL 26.4 10/06/2011     DM: Lab Results   Component Value Date    HGBA1C 5.0 10/06/2011    LDLCALC 107.8 10/06/2011    CREATININE 0.8 02/05/2018     Thyroid: Lab Results   Component Value Date    TSH 2.18 10/05/2011    FREET4 1.19 10/31/2008     Anemia: No results found for: IRON, TIBC, FERRITIN, [id number], FOLATE  Cardiac: Lab Results   Component Value Date    TROPONINI 0.061 (H) 02/05/2018    BNP 1,391 (H) 02/05/2018     Urinalysis: Lab Results   Component Value Date    LABURIN NO GROWTH AFTER 48 HOURS. 10/28/2008    COLORU Yellow 10/05/2014    SPECGRAV 1.005 10/05/2014    NITRITE Negative 10/05/2014    KETONESU Negative 10/05/2014    UROBILINOGEN Negative 10/05/2014    WBCUA 4 10/05/2014       Trended Lab Data:    Recent Labs  Lab 02/05/18  1707   WBC 6.61   HGB 16.6*   HCT 54.4*      MCV 84   RDW 20.1*   *   K 4.5   CL 93*   CO2 33*   BUN 21*   CREATININE 0.8      PROT 6.5   ALBUMIN 3.0*   BILITOT 0.7   AST 25   ALKPHOS  76   ALT 16       Trended Cardiac Data:    Recent Labs  Lab 02/05/18  1707   TROPONINI 0.061*   BNP 1,391*           Other Results:  EKG (my interpretation): ordered and pending    Radiology:  Imaging Results          X-Ray Chest PA And Lateral (Final result)  Result time 02/05/18 18:50:40    Final result by Stormy Lord MD (02/05/18 18:50:40)                 Impression:        Findings suggesting pulmonary edema/CHF pattern with small bilateral pleural effusions. Interstitial pneumonia not excluded. No large consolidation.      Electronically signed by: STORMY LORD MD, MD  Date:     02/05/18  Time:    18:50              Narrative:    COMPARISON: Chest radiograph 10/29/14    FINDINGS: PA and lateral views of the chest. Monitoring leads and tubing overlie the chest. Cardiac silhouette is mildly enlarged with bilateral diffuse interstitial coarsening suggesting pulmonary edema/CHF pattern. Blunting of both costophrenic angles, right greater than left, consistent with small pleural effusions. Scattered linear opacities throughout both lungs consistent with platelike scarring versus atelectasis. No large consolidation or pneumothorax. Mediastinal contours are within normal limits. Included osseous structures appear grossly stable without acute process seen.                                   Assessment:     Sonya Heredia is a 62 y.o. female with:  Patient Active Problem List    Diagnosis Date Noted    New onset of congestive heart failure 02/05/2018    Hypoxia 02/05/2018    COPD with acute exacerbation 02/05/2018    Peripheral cyanosis 02/05/2018    Nicotine abuse 02/05/2018    Elevated hemoglobin 02/05/2018    Acute respiratory failure with hypoxia 02/05/2018    Vasculitic ulcer of left lower extremity, limited to breakdown of skin 02/05/2018    HTN (hypertension) 02/05/2018    Anxiety 02/05/2018    Elevated serum creatinine 09/21/2017    Serum carbon dioxide increased 09/21/2017    Hyperkalemia  09/21/2017    C. difficile colitis 11/01/2014    Pancolitis 10/05/2014    Sepsis 10/05/2014    Dehydration 10/05/2014    Diarrhea 10/05/2014    Hypotension 10/05/2014    Hyponatremia 10/05/2014    Generalized abdominal pain 10/05/2014    Metabolic acidosis 10/05/2014        Plan:     New onset heart failure of unknown EF  -patient with LE edema, some mild SOB, BNP elevated to 1391, CXR showing pum vascular congestion  -given lasix 20 IV in ED with 500 cc or so output now. Will continue lasix 20 IV BID- next dose in AM tomorrow.  -2D echo ordered for tomorrow. Will need ACE, BB likely at some point  -daily weight, strict I/O. 2L fluid restriction, Na resricted diet    Lower extremity arterial insuffiicency  -concerns for arterial insufficiency with small arterial like ulcers on left lower extremity, legs cold to touch, pulses 1+. Will get ASHLI in AM to further assess  -wound care consult to small healing  Wounds on LLE    Acute hypoxic respiratory failure  -presented with 02 sats in 60s- required NRB- titrated now to 2L NC without respiratory distress (full sentences, maintaining sats, etc).   -suspect chronic respiratory issues, may consider ABG off oxygen in Am to fully assess. May be a difficult titration down off oxygen- likely all secondary to chronic COPD as well as some volume overload- will titrate oxygen as we diurese as well    Nicotine abuse  -discussed quitting- patient motivated. Need to refer  To [place] smoking trust on disicharge. Nicotine patches on board now    COPD  -patieht on qvar, albuterol at home, not on oxygen at home  -continue qvar, no wheezing on exam now    HTN  -not on home meds currently- BP 140s systolic now. If elevated overnight will consider starting ACE as will likely need for new onset heart failure nonetheless    Elevated CO2  -midl elevation to 33, likely chronic secondary to COPD    Elevated Hg  -eevated to 16 on admission, likely chronic from chronic hypoxia/COPD    Sleep  Disturbance  -continue home elavil qHS    Anxiety  -continue home lexapro    Diet: low sodium, 2L fliud restrict    Consults: wound care      Disposition: ASHLI, echo in AM. May need vascular consult pending official ABIs    Raina Patton MD

## 2018-02-06 NOTE — PROGRESS NOTES
Progress Note  [facility]    Primary Team: [facility] C  Admit Date: 2/5/2018   Length of Stay:  LOS: 1 day   SUBJECTIVE:   Reason for Admission:  New onset of congestive heart failure    HPI:  63 y/o WF was in her usual state of health until 3 weeks ago when she started having lower extremity swelling. She has never had this happen in the past. She denies trauma to legs, but states that initially she had blisters on the LLE that were fluid filled from swelling and popped and became scabs about 2 weeks ago. She states she has a generalized shortness of breath and has used her albuterol inhaler daily for the past few weeks (as opposed to once a month or so which was her previous baseline). She denies sleeping with extra pillows at night, chest palpitations, chest pain, weight gain. She denies numbness/tingling to lower extremities, endorses scant LE hair, denies pain, claudication, loss of temperature/touch sensation. Denies feeling like feet are normally cold at baseline.  She went to her PCP today for the leg swelling and her sats were reportedly 60s-70s% and sent to the ED for evaluation. She does not use oxygen at home. She has a COPD history. She smokes 1/2 ppd to 1 ppd since age 16. She is motivated to quit smoking now and wants to try nicotine patches. She denies recent hospital stays for COPD exacerbations or recent steroid use for COPD.  She reports good urination since being given lasix in ED.  Med history: COPD, nicotine abuse, HTN (not on meds), HLD  Home meds: elavil 25 qHS, lexapro, ventolin PRN, albuterol nebs, Qbar 80    Interval history:    No acute events overnight.  This morning, called to bedside due to pt appearing somewhat confused and with severe periorbital edema; not present yesterday per prior nursing report.  Pt oriented x 4 on my exam, though with notable facial edema.  Noted to be hypercapneic on ABG, so ordered bipap.  On reassessment later this morning, pt alert and eating  breakfast, very conversant and edema much improved.  Feels short of breath, has had ample urination overnight.    Review of Systems:  Constitutional: no fever or chills  Respiratory: positive for dyspnea on exertion  Cardiovascular: no chest pain or palpitations  Gastrointestinal: no nausea or vomiting, no abdominal pain or change in bowel habits  Musculoskeletal: no arthralgias or myalgias     OBJECTIVE:     Temp:  [97.8 °F (36.6 °C)-99.5 °F (37.5 °C)]   Pulse:  []   Resp:  [18-42]   BP: (117-173)/(67-99)   SpO2:  [66 %-100 %]  Body mass index is 24.89 kg/m².  Intake/Outake:  This Shift:  I/O this shift:  In: -   Out: 300 [Urine:300]    Net I/O past 24h:     Intake/Output Summary (Last 24 hours) at 02/06/18 0959  Last data filed at 02/06/18 0806   Gross per 24 hour   Intake              250 ml   Output             1500 ml   Net            -1250 ml             Physical Exam:  Gen- well-developed, well-nourished, NAD  CVS- S1 and S2 present, RRR, no murmurs  Resp- inspiratory and expiratory wheezes b/l, poor air movement, mild work of breathing  Abd- BS+, soft, NT, ND  Ext- no clubbing or cyanosis, trace LE edema    Laboratory:  CBC/Anemia Labs: Coags:      Recent Labs  Lab 02/05/18 1707 02/06/18  0405   WBC 6.61 5.68   HGB 16.6* 14.4   HCT 54.4* 49.5*    156   MCV 84 88   RDW 20.1* 19.9*      Recent Labs  Lab 02/05/18  1707   INR 1.2        Chemistries:     Recent Labs  Lab 02/05/18  1707 02/06/18  0405   * 137   K 4.5 5.0   CL 93* 91*   CO2 33* 35*   BUN 21* 21*   CREATININE 0.8 1.0   CALCIUM 9.0 8.4*   PROT 6.5 5.9*   BILITOT 0.7 0.4   ALKPHOS 76 66   ALT 16 13   AST 25 25        Cardiac Enzymes: Ejection Fractions:    Recent Labs      02/05/18 1707   TROPONINI  0.061*    No results found for: EF      Medications:  Scheduled Meds:   beclomethasone  2 puff Inhalation BID    enoxaparin  40 mg Subcutaneous Daily    escitalopram oxalate  20 mg Oral Daily    furosemide  20 mg Intravenous BID     nicotine  1 patch Transdermal ED 1 Time    sodium chloride 0.9%  3 mL Intravenous Q8H                             Continuous Infusions:  PRN Meds:.amitriptyline, butalbital-acetaminophen-caffeine -40 mg     ASSESSMENT/PLAN:     Acute Hypoxemic Hypercapneic Respiratory Failure  -2/2 Acute COPD exacerbation and probable new-onset ADHF  -treat underlying causes  -goal oxygen sats >88%    Probable New-Onset CHF  -consistent clinical symptoms include LE edema, mild SOB, elevated BNP, and pulmonary edema on CXR  -continue Lasix 20mg IV BID; strict I/o, daily weight, low sodium diet, 1500cc fluid restriction  -f/u ECHO  -likely needs BB +/- ACE-I prior to discharge    Acute COPD exacerbation  -pt reports no formal diagnosis of COPD, but admits to long smoking history and uses nebulizer machine at home  -continue Qvar, add scheduled nebs  -will add Prednisone 40mg x 4 more days  -check procalcitonin to assess for infection, need for antibiotics  -no home oxygen; assess prior to discharge    Tobacco Abuse  -stressed importance of smoking cessation  -Nicotine patch in place    Depression  -Continue Lexapro    DVT ppx- Lovenox  CODE Status- FULL    Dispo- home in 2-3 days pending clinical improvement    Cherie Manrique MD  Hospital Medicine Staff

## 2018-02-06 NOTE — PROGRESS NOTES
Consulted to see for LLE arterial ulcerations     02/06/18 1300       Wound 02/06/18 1230 Arterial ulcer Leg   Date First Assessed/Time First Assessed: 02/06/18 1230   Pre-existing: Yes  Wound Type: Arterial ulcer  Side: Left  Location: Leg   Wound Image     Wound WDL ex   Dressing Appearance Dry;No dressing;Open to air   Drainage Characteristics/Odor No odor   Tissue loss description Not applicable   Black (%), Wound Tissue Color 100 %   Periwound Area Redness  (up to 0.5cm surrounding )   Wound Edges (firmly adhearant )   Wound Length (cm) 1.2   Wound Width (cm) 0.7   Depth (cm) (medial foot 0.7x0.5cm eschar covered ulcer)   Care Cleansed with:;Sterile normal saline   Safety Interventions   Patient Rounds bed in low position;bed wheels locked;call light in reach;clutter free environment maintained;ID band on;placement of personal items at bedside;toileting offered;visualized patient   Safety Promotion/Fall Prevention (bed done)     Noted order for ASHLI studies to be performed . Currently foot is warm to touch , but capillary refill is sluggish . Able to palpate pedal pulses . Pt reports wounds were once blisters and drained .   Suggest betadine to areas daily .  Siomara Charlton RN CWON  [phone]

## 2018-02-06 NOTE — PROGRESS NOTES
Patient awoke with severe periorbital edema. Patient states she feels great. MD Trinity notified. Nurse asked to call physician back in 10 minutes. Will continue to monitor.

## 2018-02-06 NOTE — PROGRESS NOTES
Patient with primary coverage [facility] ([facility] Declined referral to [facility]. Not a candidate for [facility].    Removed from hf list.

## 2018-02-06 NOTE — PROGRESS NOTES
Patient very lethargic. Alert to person and place. Unaware of time and situation. Periorbital edema noted bilaterally. Dr. Manrique notified.       0802: Patient sitting on side of bed with assistance. Patient more alert now. Now oriented x4. Dr. Manrique at the bedside. Will continue to monitor

## 2018-02-07 LAB
ALBUMIN SERPL BCP-MCNC: 2.5 G/DL
ALLENS TEST: ABNORMAL
ALP SERPL-CCNC: 63 U/L
ALT SERPL W/O P-5'-P-CCNC: 11 U/L
ANION GAP SERPL CALC-SCNC: 8 MMOL/L
AST SERPL-CCNC: 16 U/L
BASOPHILS # BLD AUTO: 0.02 K/UL
BASOPHILS NFR BLD: 0.4 %
BILIRUB SERPL-MCNC: 0.2 MG/DL
BUN SERPL-MCNC: 21 MG/DL
CALCIUM SERPL-MCNC: 8 MG/DL
CHLORIDE SERPL-SCNC: 91 MMOL/L
CO2 SERPL-SCNC: 39 MMOL/L
CREAT SERPL-MCNC: 0.8 MG/DL
DELSYS: ABNORMAL
DIFFERENTIAL METHOD: ABNORMAL
EOSINOPHIL # BLD AUTO: 0 K/UL
EOSINOPHIL NFR BLD: 0 %
ERYTHROCYTE [DISTWIDTH] IN BLOOD BY AUTOMATED COUNT: 19.6 %
EST. GFR  (AFRICAN AMERICAN): >60 ML/MIN/1.73 M^2
EST. GFR  (NON AFRICAN AMERICAN): >60 ML/MIN/1.73 M^2
FLOW: 2
GLUCOSE SERPL-MCNC: 97 MG/DL
HCO3 UR-SCNC: 45.8 MMOL/L (ref 24–28)
HCT VFR BLD AUTO: 46.4 %
HGB BLD-MCNC: 13.3 G/DL
IMM GRANULOCYTES # BLD AUTO: 0.02 K/UL
IMM GRANULOCYTES NFR BLD AUTO: 0.4 %
LYMPHOCYTES # BLD AUTO: 0.9 K/UL
LYMPHOCYTES NFR BLD: 15.3 %
MCH RBC QN AUTO: 25.4 PG
MCHC RBC AUTO-ENTMCNC: 28.7 G/DL
MCV RBC AUTO: 89 FL
MODE: ABNORMAL
MONOCYTES # BLD AUTO: 0.5 K/UL
MONOCYTES NFR BLD: 9.2 %
NEUTROPHILS # BLD AUTO: 4.2 K/UL
NEUTROPHILS NFR BLD: 74.7 %
NRBC BLD-RTO: 1 /100 WBC
PCO2 BLDA: 91.5 MMHG (ref 35–45)
PH SMN: 7.31 [PH] (ref 7.35–7.45)
PLATELET # BLD AUTO: 160 K/UL
PMV BLD AUTO: 10 FL
PO2 BLDA: 60 MMHG (ref 80–100)
POC BE: 20 MMOL/L
POC SATURATED O2: 86 % (ref 95–100)
POC TCO2: 49 MMOL/L (ref 23–27)
POTASSIUM SERPL-SCNC: 4.4 MMOL/L
PROT SERPL-MCNC: 5.4 G/DL
RBC # BLD AUTO: 5.23 M/UL
SAMPLE: ABNORMAL
SITE: ABNORMAL
SODIUM SERPL-SCNC: 138 MMOL/L
WBC # BLD AUTO: 5.67 K/UL

## 2018-02-07 PROCEDURE — 63600175 PHARM REV CODE 636 W HCPCS: Performed by: HOSPITALIST

## 2018-02-07 PROCEDURE — 99900035 HC TECH TIME PER 15 MIN (STAT)

## 2018-02-07 PROCEDURE — 25000242 PHARM REV CODE 250 ALT 637 W/ HCPCS: Performed by: HOSPITALIST

## 2018-02-07 PROCEDURE — 94640 AIRWAY INHALATION TREATMENT: CPT

## 2018-02-07 PROCEDURE — A4216 STERILE WATER/SALINE, 10 ML: HCPCS | Performed by: HOSPITALIST

## 2018-02-07 PROCEDURE — 25000003 PHARM REV CODE 250: Performed by: HOSPITALIST

## 2018-02-07 PROCEDURE — S4991 NICOTINE PATCH NONLEGEND: HCPCS | Performed by: HOSPITALIST

## 2018-02-07 PROCEDURE — 85025 COMPLETE CBC W/AUTO DIFF WBC: CPT

## 2018-02-07 PROCEDURE — 36600 WITHDRAWAL OF ARTERIAL BLOOD: CPT

## 2018-02-07 PROCEDURE — 82803 BLOOD GASES ANY COMBINATION: CPT

## 2018-02-07 PROCEDURE — 63600175 PHARM REV CODE 636 W HCPCS

## 2018-02-07 PROCEDURE — 36415 COLL VENOUS BLD VENIPUNCTURE: CPT

## 2018-02-07 PROCEDURE — 94761 N-INVAS EAR/PLS OXIMETRY MLT: CPT

## 2018-02-07 PROCEDURE — 94660 CPAP INITIATION&MGMT: CPT

## 2018-02-07 PROCEDURE — 27000221 HC OXYGEN, UP TO 24 HOURS

## 2018-02-07 PROCEDURE — 80053 COMPREHEN METABOLIC PANEL: CPT

## 2018-02-07 PROCEDURE — 99233 SBSQ HOSP IP/OBS HIGH 50: CPT | Mod: ,,, | Performed by: HOSPITALIST

## 2018-02-07 PROCEDURE — 20600001 HC STEP DOWN PRIVATE ROOM

## 2018-02-07 RX ORDER — IBUPROFEN 200 MG
1 TABLET ORAL DAILY
Status: DISCONTINUED | OUTPATIENT
Start: 2018-02-07 | End: 2018-02-08 | Stop reason: HOSPADM

## 2018-02-07 RX ORDER — ERYTHROMYCIN 5 MG/G
OINTMENT OPHTHALMIC EVERY 8 HOURS
Status: DISCONTINUED | OUTPATIENT
Start: 2018-02-07 | End: 2018-02-07

## 2018-02-07 RX ORDER — ERYTHROMYCIN 5 MG/G
OINTMENT OPHTHALMIC EVERY 8 HOURS
Status: DISCONTINUED | OUTPATIENT
Start: 2018-02-07 | End: 2018-02-08 | Stop reason: HOSPADM

## 2018-02-07 RX ORDER — FUROSEMIDE 10 MG/ML
40 INJECTION INTRAMUSCULAR; INTRAVENOUS 2 TIMES DAILY
Status: DISCONTINUED | OUTPATIENT
Start: 2018-02-07 | End: 2018-02-08 | Stop reason: HOSPADM

## 2018-02-07 RX ORDER — CARVEDILOL 3.12 MG/1
3.12 TABLET ORAL 2 TIMES DAILY
Status: DISCONTINUED | OUTPATIENT
Start: 2018-02-07 | End: 2018-02-08 | Stop reason: HOSPADM

## 2018-02-07 RX ADMIN — BUTALBITAL, ACETAMINOPHEN AND CAFFEINE 1 TABLET: 50; 325; 40 TABLET ORAL at 08:02

## 2018-02-07 RX ADMIN — IPRATROPIUM BROMIDE AND ALBUTEROL SULFATE 3 ML: .5; 3 SOLUTION RESPIRATORY (INHALATION) at 12:02

## 2018-02-07 RX ADMIN — ENOXAPARIN SODIUM 40 MG: 100 INJECTION SUBCUTANEOUS at 04:02

## 2018-02-07 RX ADMIN — IPRATROPIUM BROMIDE AND ALBUTEROL SULFATE 3 ML: .5; 3 SOLUTION RESPIRATORY (INHALATION) at 07:02

## 2018-02-07 RX ADMIN — IPRATROPIUM BROMIDE AND ALBUTEROL SULFATE 3 ML: .5; 3 SOLUTION RESPIRATORY (INHALATION) at 08:02

## 2018-02-07 RX ADMIN — AMITRIPTYLINE HYDROCHLORIDE 25 MG: 25 TABLET, FILM COATED ORAL at 08:02

## 2018-02-07 RX ADMIN — CARVEDILOL 3.12 MG: 3.12 TABLET, FILM COATED ORAL at 08:02

## 2018-02-07 RX ADMIN — CARVEDILOL 3.12 MG: 3.12 TABLET, FILM COATED ORAL at 11:02

## 2018-02-07 RX ADMIN — ERYTHROMYCIN 1 INCH: 5 OINTMENT OPHTHALMIC at 08:02

## 2018-02-07 RX ADMIN — NICOTINE 1 PATCH: 14 PATCH, EXTENDED RELEASE TRANSDERMAL at 12:02

## 2018-02-07 RX ADMIN — ERYTHROMYCIN: 5 OINTMENT OPHTHALMIC at 02:02

## 2018-02-07 RX ADMIN — BUTALBITAL, ACETAMINOPHEN AND CAFFEINE 1 TABLET: 50; 325; 40 TABLET ORAL at 11:02

## 2018-02-07 RX ADMIN — Medication 3 ML: at 02:02

## 2018-02-07 RX ADMIN — PREDNISONE 40 MG: 20 TABLET ORAL at 08:02

## 2018-02-07 RX ADMIN — BECLOMETHASONE DIPROPIONATE 2 PUFF: 40 AEROSOL, METERED RESPIRATORY (INHALATION) at 08:02

## 2018-02-07 RX ADMIN — FUROSEMIDE 40 MG: 10 INJECTION, SOLUTION INTRAMUSCULAR; INTRAVENOUS at 06:02

## 2018-02-07 RX ADMIN — ESCITALOPRAM 20 MG: 20 TABLET, FILM COATED ORAL at 08:02

## 2018-02-07 RX ADMIN — IPRATROPIUM BROMIDE AND ALBUTEROL SULFATE 3 ML: .5; 3 SOLUTION RESPIRATORY (INHALATION) at 03:02

## 2018-02-07 RX ADMIN — FUROSEMIDE 20 MG: 10 INJECTION, SOLUTION INTRAMUSCULAR; INTRAVENOUS at 08:02

## 2018-02-07 NOTE — PLAN OF CARE
Problem: Patient Care Overview  Goal: Plan of Care Review  Outcome: Ongoing (interventions implemented as appropriate)  Patient remains free from falls and injuries through out shift. Patient AAO and VSS. Patient denies chest pain and SOB. Patient given Lasix 20mg IVP BID per MD order. Bipap continues per MD order. Plan of care reviewed with patient. Patient verbalizes understanding of plan.  Will continue to monitor.

## 2018-02-07 NOTE — PROGRESS NOTES
Progress Note  [facility] Medicine    Primary Team: [facility] HOSP MED C  Admit Date: 2/5/2018   Length of Stay:  LOS: 2 days   SUBJECTIVE:   Reason for Admission:  Acute respiratory failure with hypoxia    HPI:  63 y/o WF was in her usual state of health until 3 weeks ago when she started having lower extremity swelling. She has never had this happen in the past. She denies trauma to legs, but states that initially she had blisters on the LLE that were fluid filled from swelling and popped and became scabs about 2 weeks ago. She states she has a generalized shortness of breath and has used her albuterol inhaler daily for the past few weeks (as opposed to once a month or so which was her previous baseline). She denies sleeping with extra pillows at night, chest palpitations, chest pain, weight gain. She denies numbness/tingling to lower extremities, endorses scant LE hair, denies pain, claudication, loss of temperature/touch sensation. Denies feeling like feet are normally cold at baseline.  She went to her PCP today for the leg swelling and her sats were reportedly 60s-70s% and sent to the ED for evaluation. She does not use oxygen at home. She has a COPD history. She smokes 1/2 ppd to 1 ppd since age 16. She is motivated to quit smoking now and wants to try nicotine patches. She denies recent hospital stays for COPD exacerbations or recent steroid use for COPD.  She reports good urination since being given lasix in ED.  Med history: COPD, nicotine abuse, HTN (not on meds), HLD  Home meds: elavil 25 qHS, lexapro, ventolin PRN, albuterol nebs, Qbar 80    Interval history:    No acute events overnight.  Pt wore bipap overnight and feels well today, though still has some LE edema.  Reviewed results of ECHO with pt and her daughter yesterday, as well as importance of fluid restriction and diet and smoking cessation.    Review of Systems:  Constitutional: no fever or chills  Respiratory: positive for dyspnea on  exertion  Cardiovascular: no chest pain or palpitations  Gastrointestinal: no nausea or vomiting, no abdominal pain or change in bowel habits  Musculoskeletal: no arthralgias or myalgias     OBJECTIVE:     Temp:  [96.3 °F (35.7 °C)-99.3 °F (37.4 °C)]   Pulse:  []   Resp:  [15-20]   BP: (106-135)/(59-88)   SpO2:  [90 %-98 %]  Body mass index is 24.74 kg/m².  Intake/Outake:  This Shift:  No intake/output data recorded.    Net I/O past 24h:     Intake/Output Summary (Last 24 hours) at 02/07/18 1019  Last data filed at 02/07/18 0500   Gross per 24 hour   Intake             1160 ml   Output             2550 ml   Net            -1390 ml             Physical Exam:  Gen- well-developed, well-nourished, NAD  CVS- S1 and S2 present, RRR, no murmurs  Resp- poor air movement, no work of breathing  Abd- BS+, soft, NT, ND  Ext- no clubbing or cyanosis, trace LE edema    Laboratory:  CBC/Anemia Labs: Coags:      Recent Labs  Lab 02/05/18  1707 02/06/18  0405 02/07/18  0441   WBC 6.61 5.68 5.67   HGB 16.6* 14.4 13.3   HCT 54.4* 49.5* 46.4    156 160   MCV 84 88 89   RDW 20.1* 19.9* 19.6*      Recent Labs  Lab 02/05/18  1707   INR 1.2        Chemistries:     Recent Labs  Lab 02/05/18  1707 02/06/18  0405 02/07/18  0441   * 137 138   K 4.5 5.0 4.4   CL 93* 91* 91*   CO2 33* 35* 39*   BUN 21* 21* 21*   CREATININE 0.8 1.0 0.8   CALCIUM 9.0 8.4* 8.0*   PROT 6.5 5.9* 5.4*   BILITOT 0.7 0.4 0.2   ALKPHOS 76 66 63   ALT 16 13 11   AST 25 25 16        Cardiac Enzymes: Ejection Fractions:    Recent Labs      02/05/18   1707   TROPONINI  0.061*    EF   Date Value Ref Range Status   02/06/2018 65 55 - 65          Medications:  Scheduled Meds:   albuterol-ipratropium 2.5mg-0.5mg/3mL  3 mL Nebulization Q4H WAKE    beclomethasone  2 puff Inhalation BID    enoxaparin  40 mg Subcutaneous Daily    erythromycin   Both Eyes Q8H    escitalopram oxalate  20 mg Oral Daily    furosemide  40 mg Intravenous BID    predniSONE  40 mg  Oral Daily    sodium chloride 0.9%  3 mL Intravenous Q8H                             Continuous Infusions:  PRN Meds:.amitriptyline, butalbital-acetaminophen-caffeine -40 mg, dextromethorphan-guaifenesin  mg/5 ml     ASSESSMENT/PLAN:     Acute Hypoxemic Hypercapneic Respiratory Failure  -2/2 Acute COPD exacerbation and probable new-onset ADHF  -treat underlying causes  -goal oxygen sats >88%    Acute decompensated Diastolic CHF  -consistent clinical symptoms include LE edema, mild SOB, elevated BNP, and pulmonary edema on CXR  -continue Lasix but increase to 40mg IV BID; strict I/o, daily weight, low sodium diet, 1500cc fluid restriction  -add Coreg 3.125mg BID  -ECHO 2/6/18:    1 - Normal left ventricular systolic function (EF 60-65%).     2 - Indeterminate LV diastolic function.     3 - Right atrial enlargement.     4 - Right ventricular enlargement with hypertrophy, with mildly depressed systolic function.     5 - Pulmonary hypertension. The estimated PA systolic pressure is 67 mmHg.     6 - Moderate tricuspid regurgitation.     7 - Small pericardial effusion.     8 - Increased central venous pressure.     Acute COPD exacerbation  -pt reports no formal diagnosis of COPD, but admits to long smoking history and uses nebulizer machine at home  -continue Qvar, add scheduled nebs  -will continue Prednisone 40mg x 5 days  -procalcitonin normal  -no home oxygen; 6 minute walk test today    Tobacco Abuse  -stressed importance of smoking cessation  -Nicotine patch in place and helping patient    Depression  -Continue Lexapro    Vasculitic Ulcer of LLE  -f/u ASHLI of b/l LE    DVT ppx- Lovenox  CODE Status- FULL    Dispo- home today or tomorrow pending oxygen sats    Cherie Manrique MD  Hospital Medicine Staff

## 2018-02-07 NOTE — NURSING
Called Pulmonary Lab, [phone]- [phone], to inquire about 6-minute walk.  Pulmonary Team stated if patient is not discharged x 24h, walk is not covered by insurance.  Called team to confirm discharge.

## 2018-02-07 NOTE — PLAN OF CARE
Problem: Patient Care Overview  Goal: Plan of Care Review  Outcome: Ongoing (interventions implemented as appropriate)  Reviewed plan of care with patient.   Lasix 20 mg INJ BID.  6-minute walk for home oxygen ordered.  CAR US Brachial Indices EXT LTD WO ordered.  BIPAP at HS.  Left leg ulcerations apply betadine, right foot swollen and ulcer on dorsal side, leave open to air.  Patient will remain free of fall/trauma/injury by using appropriate lighting, nonskid socks, and by keeping area free of debris.  Patient verbalized understanding of all instructions.

## 2018-02-07 NOTE — NURSING
Home Oxygen Evaluation    Date Performed: 2/7/2018    1) Patient's Home O2 Sat on room air, while at rest:     88%      If O2 sats on room air at rest are 88% or below, patient qualifies. No additional testing needed. Document N/A in steps 2 and 3. If 89% or above, complete steps 2.      2) Patient's O2 Sat on room air while exercising: N/A        If O2 sats on room air while exercising remain 89% or above patient does not qualify, no further testing needed Document N/A in step 3. If O2 sats on room air while exercising are 88% or below, continue to step 3.      3) Patient's O2 Sat while exercising on O2: N/A       (Must show improvement from #2 for patients to qualify)    If O2 sats improve on oxygen, patient qualifies for portable oxygen. If not, the patient does not qualify.

## 2018-02-07 NOTE — PLAN OF CARE
Follow up with Dr Shayna Collins at [facility] scheduled for 3-14. Fax DC summary to [place] at [phone]

## 2018-02-07 NOTE — NURSING
ABGs completed.  CO2 = 92.  Respiratory team placed patient from 2L nasal cannula to BIPAP 40%.  Patient presents as confused and agitated.  Will complete walk for home oxygen when patient is safe to ambulate.

## 2018-02-08 VITALS
SYSTOLIC BLOOD PRESSURE: 111 MMHG | WEIGHT: 128.5 LBS | TEMPERATURE: 98 F | HEIGHT: 61 IN | HEART RATE: 91 BPM | DIASTOLIC BLOOD PRESSURE: 61 MMHG | BODY MASS INDEX: 24.26 KG/M2 | RESPIRATION RATE: 15 BRPM | OXYGEN SATURATION: 90 %

## 2018-02-08 LAB
ALBUMIN SERPL BCP-MCNC: 2.5 G/DL
ALP SERPL-CCNC: 61 U/L
ALT SERPL W/O P-5'-P-CCNC: 10 U/L
ANION GAP SERPL CALC-SCNC: 4 MMOL/L
AST SERPL-CCNC: 17 U/L
BASOPHILS # BLD AUTO: 0.04 K/UL
BASOPHILS NFR BLD: 0.7 %
BILIRUB SERPL-MCNC: 0.3 MG/DL
BUN SERPL-MCNC: 22 MG/DL
CALCIUM SERPL-MCNC: 8.1 MG/DL
CHLORIDE SERPL-SCNC: 87 MMOL/L
CO2 SERPL-SCNC: 46 MMOL/L
CREAT SERPL-MCNC: 0.9 MG/DL
DIFFERENTIAL METHOD: ABNORMAL
EOSINOPHIL # BLD AUTO: 0.1 K/UL
EOSINOPHIL NFR BLD: 1.4 %
ERYTHROCYTE [DISTWIDTH] IN BLOOD BY AUTOMATED COUNT: 20.1 %
EST. GFR  (AFRICAN AMERICAN): >60 ML/MIN/1.73 M^2
EST. GFR  (NON AFRICAN AMERICAN): >60 ML/MIN/1.73 M^2
GLUCOSE SERPL-MCNC: 95 MG/DL
HCT VFR BLD AUTO: 46.2 %
HGB BLD-MCNC: 13.4 G/DL
IMM GRANULOCYTES # BLD AUTO: 0.03 K/UL
IMM GRANULOCYTES NFR BLD AUTO: 0.5 %
LYMPHOCYTES # BLD AUTO: 1.1 K/UL
LYMPHOCYTES NFR BLD: 20.1 %
MCH RBC QN AUTO: 25.5 PG
MCHC RBC AUTO-ENTMCNC: 29 G/DL
MCV RBC AUTO: 88 FL
MONOCYTES # BLD AUTO: 0.5 K/UL
MONOCYTES NFR BLD: 9.4 %
NEUTROPHILS # BLD AUTO: 3.8 K/UL
NEUTROPHILS NFR BLD: 67.9 %
NRBC BLD-RTO: 0 /100 WBC
PLATELET # BLD AUTO: 183 K/UL
PMV BLD AUTO: 10.5 FL
POTASSIUM SERPL-SCNC: 3.8 MMOL/L
PROT SERPL-MCNC: 5.3 G/DL
RBC # BLD AUTO: 5.25 M/UL
SODIUM SERPL-SCNC: 137 MMOL/L
VASCULAR ANKLE BRACHIAL INDEX (ABI) RIGHT: 1.06 (ref 0.9–1.2)
WBC # BLD AUTO: 5.63 K/UL

## 2018-02-08 PROCEDURE — 94761 N-INVAS EAR/PLS OXIMETRY MLT: CPT

## 2018-02-08 PROCEDURE — 80053 COMPREHEN METABOLIC PANEL: CPT

## 2018-02-08 PROCEDURE — 63600175 PHARM REV CODE 636 W HCPCS: Performed by: HOSPITALIST

## 2018-02-08 PROCEDURE — 94640 AIRWAY INHALATION TREATMENT: CPT

## 2018-02-08 PROCEDURE — S4991 NICOTINE PATCH NONLEGEND: HCPCS | Performed by: HOSPITALIST

## 2018-02-08 PROCEDURE — 99900035 HC TECH TIME PER 15 MIN (STAT)

## 2018-02-08 PROCEDURE — 27000221 HC OXYGEN, UP TO 24 HOURS

## 2018-02-08 PROCEDURE — 25000242 PHARM REV CODE 250 ALT 637 W/ HCPCS: Performed by: HOSPITALIST

## 2018-02-08 PROCEDURE — 25000003 PHARM REV CODE 250: Performed by: HOSPITALIST

## 2018-02-08 PROCEDURE — 36415 COLL VENOUS BLD VENIPUNCTURE: CPT

## 2018-02-08 PROCEDURE — A4216 STERILE WATER/SALINE, 10 ML: HCPCS | Performed by: HOSPITALIST

## 2018-02-08 PROCEDURE — 85025 COMPLETE CBC W/AUTO DIFF WBC: CPT

## 2018-02-08 PROCEDURE — 94660 CPAP INITIATION&MGMT: CPT

## 2018-02-08 PROCEDURE — 99239 HOSP IP/OBS DSCHRG MGMT >30: CPT | Mod: ,,, | Performed by: HOSPITALIST

## 2018-02-08 RX ORDER — POTASSIUM CHLORIDE 20 MEQ/1
20 TABLET, EXTENDED RELEASE ORAL ONCE
Status: COMPLETED | OUTPATIENT
Start: 2018-02-08 | End: 2018-02-08

## 2018-02-08 RX ORDER — PREDNISONE 20 MG/1
40 TABLET ORAL DAILY
Qty: 6 TABLET | Refills: 0 | Status: SHIPPED | OUTPATIENT
Start: 2018-02-09 | End: 2018-02-12

## 2018-02-08 RX ORDER — FUROSEMIDE 40 MG/1
40 TABLET ORAL DAILY
Qty: 30 TABLET | Refills: 2 | Status: SHIPPED | OUTPATIENT
Start: 2018-02-08 | End: 2019-10-20

## 2018-02-08 RX ORDER — CARVEDILOL 3.12 MG/1
3.12 TABLET ORAL 2 TIMES DAILY
Qty: 60 TABLET | Refills: 2 | Status: ON HOLD | OUTPATIENT
Start: 2018-02-08 | End: 2019-11-11 | Stop reason: HOSPADM

## 2018-02-08 RX ORDER — IBUPROFEN 200 MG
1 TABLET ORAL DAILY
Refills: 0 | COMMUNITY
Start: 2018-02-09 | End: 2019-10-20

## 2018-02-08 RX ADMIN — ERYTHROMYCIN: 5 OINTMENT OPHTHALMIC at 03:02

## 2018-02-08 RX ADMIN — IPRATROPIUM BROMIDE AND ALBUTEROL SULFATE 3 ML: .5; 3 SOLUTION RESPIRATORY (INHALATION) at 07:02

## 2018-02-08 RX ADMIN — PREDNISONE 40 MG: 20 TABLET ORAL at 08:02

## 2018-02-08 RX ADMIN — CARVEDILOL 3.12 MG: 3.12 TABLET, FILM COATED ORAL at 08:02

## 2018-02-08 RX ADMIN — IPRATROPIUM BROMIDE AND ALBUTEROL SULFATE 3 ML: .5; 3 SOLUTION RESPIRATORY (INHALATION) at 11:02

## 2018-02-08 RX ADMIN — ESCITALOPRAM 20 MG: 20 TABLET, FILM COATED ORAL at 08:02

## 2018-02-08 RX ADMIN — NICOTINE 1 PATCH: 14 PATCH, EXTENDED RELEASE TRANSDERMAL at 08:02

## 2018-02-08 RX ADMIN — POTASSIUM CHLORIDE 20 MEQ: 1500 TABLET, EXTENDED RELEASE ORAL at 08:02

## 2018-02-08 RX ADMIN — Medication 3 ML: at 03:02

## 2018-02-08 RX ADMIN — FUROSEMIDE 40 MG: 10 INJECTION, SOLUTION INTRAMUSCULAR; INTRAVENOUS at 08:02

## 2018-02-08 RX ADMIN — BECLOMETHASONE DIPROPIONATE 2 PUFF: 40 AEROSOL, METERED RESPIRATORY (INHALATION) at 08:02

## 2018-02-08 RX ADMIN — BUTALBITAL, ACETAMINOPHEN AND CAFFEINE 1 TABLET: 50; 325; 40 TABLET ORAL at 08:02

## 2018-02-08 RX ADMIN — ERYTHROMYCIN 1 INCH: 5 OINTMENT OPHTHALMIC at 05:02

## 2018-02-08 RX ADMIN — IPRATROPIUM BROMIDE AND ALBUTEROL SULFATE 3 ML: .5; 3 SOLUTION RESPIRATORY (INHALATION) at 04:02

## 2018-02-08 NOTE — PROGRESS NOTES
Progress Note  [facility] Medicine    Primary Team: [facility] MED C  Admit Date: 2/5/2018   Length of Stay:  LOS: 3 days   SUBJECTIVE:   Reason for Admission:  Acute respiratory failure with hypoxia    HPI:  61 y/o WF was in her usual state of health until 3 weeks ago when she started having lower extremity swelling. She has never had this happen in the past. She denies trauma to legs, but states that initially she had blisters on the LLE that were fluid filled from swelling and popped and became scabs about 2 weeks ago. She states she has a generalized shortness of breath and has used her albuterol inhaler daily for the past few weeks (as opposed to once a month or so which was her previous baseline). She denies sleeping with extra pillows at night, chest palpitations, chest pain, weight gain. She denies numbness/tingling to lower extremities, endorses scant LE hair, denies pain, claudication, loss of temperature/touch sensation. Denies feeling like feet are normally cold at baseline.  She went to her PCP today for the leg swelling and her sats were reportedly 60s-70s% and sent to the ED for evaluation. She does not use oxygen at home. She has a COPD history. She smokes 1/2 ppd to 1 ppd since age 16. She is motivated to quit smoking now and wants to try nicotine patches. She denies recent hospital stays for COPD exacerbations or recent steroid use for COPD.  She reports good urination since being given lasix in ED.  Med history: COPD, nicotine abuse, HTN (not on meds), HLD  Home meds: elavil 25 qHS, lexapro, ventolin PRN, albuterol nebs, Qbar 80    Interval history:    No acute events overnight.  Pt wore bipap overnight and feels well today.  Edema improved.  Has never seen a Pulmonologist, requests to be referred through [facility].    Review of Systems:  Constitutional: no fever or chills  Respiratory: no cough or shortness of breath  Cardiovascular: no chest pain or palpitations  Gastrointestinal: no nausea or  vomiting, no abdominal pain or change in bowel habits  Musculoskeletal: no arthralgias or myalgias     OBJECTIVE:     Temp:  [97.6 °F (36.4 °C)-98.7 °F (37.1 °C)]   Pulse:  []   Resp:  [14-22]   BP: ()/(54-79)   SpO2:  [90 %-98 %]  Body mass index is 24.29 kg/m².  Intake/Outake:  This Shift:  No intake/output data recorded.    Net I/O past 24h:     Intake/Output Summary (Last 24 hours) at 02/08/18 0947  Last data filed at 02/08/18 0500   Gross per 24 hour   Intake              480 ml   Output             4200 ml   Net            -3720 ml             Physical Exam:  Gen- well-developed, well-nourished, NAD  CVS- S1 and S2 present, RRR, no murmurs  Resp- poor air movement, no work of breathing  Abd- BS+, soft, NT, ND  Ext- no clubbing or cyanosis, trace LE edema    Laboratory:  CBC/Anemia Labs: Coags:      Recent Labs  Lab 02/06/18  0405 02/07/18  0441 02/08/18  0434   WBC 5.68 5.67 5.63   HGB 14.4 13.3 13.4   HCT 49.5* 46.4 46.2    160 183   MCV 88 89 88   RDW 19.9* 19.6* 20.1*      Recent Labs  Lab 02/05/18  1707   INR 1.2        Chemistries:     Recent Labs  Lab 02/06/18  0405 02/07/18  0441 02/08/18  0434    138 137   K 5.0 4.4 3.8   CL 91* 91* 87*   CO2 35* 39* 46*   BUN 21* 21* 22*   CREATININE 1.0 0.8 0.9   CALCIUM 8.4* 8.0* 8.1*   PROT 5.9* 5.4* 5.3*   BILITOT 0.4 0.2 0.3   ALKPHOS 66 63 61   ALT 13 11 10   AST 25 16 17        Cardiac Enzymes: Ejection Fractions:    Recent Labs      02/05/18   1707   TROPONINI  0.061*    EF   Date Value Ref Range Status   02/06/2018 65 55 - 65          Medications:  Scheduled Meds:   albuterol-ipratropium 2.5mg-0.5mg/3mL  3 mL Nebulization Q4H WAKE    beclomethasone  2 puff Inhalation BID    carvedilol  3.125 mg Oral BID    enoxaparin  40 mg Subcutaneous Daily    erythromycin   Both Eyes Q8H    escitalopram oxalate  20 mg Oral Daily    furosemide  40 mg Intravenous BID    nicotine  1 patch Transdermal Daily    predniSONE  40 mg Oral Daily     sodium chloride 0.9%  3 mL Intravenous Q8H                             Continuous Infusions:  PRN Meds:.amitriptyline, butalbital-acetaminophen-caffeine -40 mg, dextromethorphan-guaifenesin  mg/5 ml     ASSESSMENT/PLAN:     Acute Hypoxemic Hypercapneic Respiratory Failure  -2/2 Acute COPD exacerbation and new-onset ADHF  -treat underlying causes  -goal oxygen sats >88%    Acute decompensated Diastolic CHF  -consistent clinical symptoms include LE edema, mild SOB, elevated BNP, and pulmonary edema on CXR  -negative 3.7L over past 24h  -change Lasix to 40mg daily; strict I/o, daily weight, low sodium diet, 1500cc fluid restriction  -Coreg 3.125mg BID  -ECHO 2/6/18:    1 - Normal left ventricular systolic function (EF 60-65%).     2 - Indeterminate LV diastolic function.     3 - Right atrial enlargement.     4 - Right ventricular enlargement with hypertrophy, with mildly depressed systolic function.     5 - Pulmonary hypertension. The estimated PA systolic pressure is 67 mmHg.     6 - Moderate tricuspid regurgitation.     7 - Small pericardial effusion.     8 - Increased central venous pressure.     Acute COPD exacerbation  -pt reports no formal diagnosis of COPD, but admits to long smoking history and uses nebulizer machine at home  -continue Qvar, scheduled nebs  -will continue Prednisone 40mg x 5 days  -procalcitonin normal  -no home oxygen; check RA and ambulating sats today    Tobacco Abuse  -stressed importance of smoking cessation  -Nicotine patch in place and helping patient    Depression  -Continue Lexapro    Vasculitic Ulcer of LLE  -per patient, started as blister prior to becoming ulcer  -b/l resting ASHLI normal: right 1.06, left 1.09    DVT ppx- Lovenox  CODE Status- FULL    Dispo- home today pending oxygen sats    Cherie Manrique MD  Hospital Medicine Staff

## 2018-02-08 NOTE — PLAN OF CARE
Problem: Patient Care Overview  Goal: Plan of Care Review  Outcome: Ongoing (interventions implemented as appropriate)  Patient verbalizes no complaints overnight; denies chest pain, SOB, or other discomfort. Pt remains free of falls or injuries. Pt verbalizes complete understanding of plan of care. Will continue to monitor.

## 2018-02-08 NOTE — NURSING
Home Oxygen Evaluation    Date Performed: 2/8/2018    1) Patient's Home O2 Sat on room air, while at rest: 83%        If O2 sats on room air at rest are 88% or below, patient qualifies. No additional testing needed. Document N/A in steps 2 and 3. If 89% or above, complete steps 2.      2) Patient's O2 Sat on room air while exercising: N/A        If O2 sats on room air while exercising remain 89% or above patient does not qualify, no further testing needed Document N/A in step 3. If O2 sats on room air while exercising are 88% or below, continue to step 3.      3) Patient's O2 Sat while exercising on O2:  N/A     (Must show improvement from #2 for patients to qualify)    If O2 sats improve on oxygen, patient qualifies for portable oxygen. If not, the patient does not qualify.

## 2018-02-08 NOTE — DISCHARGE SUMMARY
DISCHARGE SUMMARY  Hospital Medicine    Team: [facility] MED C    Patient Name: Sonya Heredia  YOB: 1955    Admit Date: 2/5/2018    Discharge Date: 02/08/2018    Discharge Attending Physician: Cherie Manrique MD     Principal Diagnoses:  Active Hospital Problems    Diagnosis  POA    *Acute respiratory failure with hypoxia [J96.01]  Yes    Alteration in skin integrity [R23.9]  Yes    New onset of congestive heart failure [I50.9]  Yes    Hypoxia [R09.02]  Yes    COPD with acute exacerbation [J44.1]  Yes    Peripheral cyanosis [R23.0]  Yes    Nicotine abuse [Z72.0]  Yes    Elevated hemoglobin [D58.2]  Yes    Vasculitic ulcer of left lower extremity, limited to breakdown of skin [L97.921]  Yes    HTN (hypertension) [I10]  Yes    Anxiety [F41.9]  Yes      Resolved Hospital Problems    Diagnosis Date Resolved POA   No resolved problems to display.       Discharged Condition: stable    HOSPITAL COURSE:      Initial Presentation:  63 y/o WF was in her usual state of health until 3 weeks ago when she started having lower extremity swelling. She has never had this happen in the past. She denies trauma to legs, but states that initially she had blisters on the LLE that were fluid filled from swelling and popped and became scabs about 2 weeks ago. She states she has a generalized shortness of breath and has used her albuterol inhaler daily for the past few weeks (as opposed to once a month or so which was her previous baseline). She denies sleeping with extra pillows at night, chest palpitations, chest pain, weight gain. She denies numbness/tingling to lower extremities, endorses scant LE hair, denies pain, claudication, loss of temperature/touch sensation. Denies feeling like feet are normally cold at baseline.  She went to her PCP today for the leg swelling and her sats were reportedly 60s-70s% and sent to the ED for evaluation. She does not use oxygen at home. She has a COPD history. She smokes 1/2  ppd to 1 ppd since age 16. She is motivated to quit smoking now and wants to try nicotine patches. She denies recent hospital stays for COPD exacerbations or recent steroid use for COPD.    Course of Principle Problem for Admission:    Pt was admitted to [facility] for evaluation of acute hypoxemic and hypercapneic respiratory failure, due to acute COPD exacerbation and newly diagnosed acute decompensated heart failure.  She diuresed well with Lasix and was treated with Prednisone and nebs for COPD, as well as nightly bipap.  By day of discharge, pt was still requiring oxygen and was discharged home with oxygen.    Other Medical Problems Addressed in the Hospital:    Acute decompensated Diastolic CHF  -consistent clinical symptoms include LE edema, mild SOB, elevated BNP, and pulmonary edema on CXR  -changed Lasix to 40mg daily; strict I/o, daily weight, low sodium diet, 1500cc fluid restriction  -Coreg 3.125mg BID  -ECHO 2/6/18:    1 - Normal left ventricular systolic function (EF 60-65%).     2 - Indeterminate LV diastolic function.     3 - Right atrial enlargement.     4 - Right ventricular enlargement with hypertrophy, with mildly depressed systolic function.     5 - Pulmonary hypertension. The estimated PA systolic pressure is 67 mmHg.     6 - Moderate tricuspid regurgitation.     7 - Small pericardial effusion.     8 - Increased central venous pressure.     Acute COPD exacerbation  -pt reports no formal diagnosis of COPD, but admits to long smoking history and uses nebulizer machine at home  -continue Qvar, scheduled nebs  -will continue Prednisone 40mg x 5 days  -procalcitonin normal  -discharge with home oxygen     Tobacco Abuse  -stressed importance of smoking cessation  -Nicotine patch in place and helping patient     Depression  -Continue Lexapro     Vasculitic Ulcer of LLE  -per patient, started as blister prior to becoming ulcer  -b/l resting ASHLI normal: right 1.06, left 1.09    Consults: None    Last  CBC/BMP:    CBC/Anemia Labs: Coags:      Recent Labs  Lab 02/06/18  0405 02/07/18  0441 02/08/18  0434   WBC 5.68 5.67 5.63   HGB 14.4 13.3 13.4   HCT 49.5* 46.4 46.2    160 183   MCV 88 89 88   RDW 19.9* 19.6* 20.1*      Recent Labs  Lab 02/05/18  1707   INR 1.2        Chemistries:     Recent Labs  Lab 02/06/18  0405 02/07/18  0441 02/08/18  0434    138 137   K 5.0 4.4 3.8   CL 91* 91* 87*   CO2 35* 39* 46*   BUN 21* 21* 22*   CREATININE 1.0 0.8 0.9   CALCIUM 8.4* 8.0* 8.1*   PROT 5.9* 5.4* 5.3*   BILITOT 0.4 0.2 0.3   ALKPHOS 66 63 61   ALT 13 11 10   AST 25 16 17            Significant Diagnostic Studies: as above    Special Treatments/Procedures:   * No surgery found *     Disposition: Home or Self Care      Future Scheduled Appointments:  No future appointments.    Discharge Medication List:       Sonya Heredia   Home Medication Instructions HARSHAL:[id number]    Printed on:02/08/18 1501   Medication Information                      albuterol (PROVENTIL) 2.5 mg /3 mL (0.083 %) nebulizer solution  Take 2.5 mg by nebulization every 6 (six) hours as needed for Wheezing. Rescue             albuterol (VENTOLIN HFA) 90 mcg/actuation inhaler  Inhale 2 puffs into the lungs every 6 (six) hours as needed for Wheezing. Rescue             amitriptyline (ELAVIL) 25 MG tablet  Take 25 mg by mouth nightly as needed for Insomnia.             beclomethasone (QVAR) 80 mcg/actuation Aero  Inhale 1 puff into the lungs 2 (two) times daily. Controller             carvedilol (COREG) 3.125 MG tablet  Take 1 tablet (3.125 mg total) by mouth 2 (two) times daily.             escitalopram oxalate (LEXAPRO) 20 MG tablet  Take 20 mg by mouth once daily.             furosemide (LASIX) 40 MG tablet  Take 1 tablet (40 mg total) by mouth once daily.             nicotine (NICODERM CQ) 14 mg/24 hr  Place 1 patch onto the skin once daily.             predniSONE (DELTASONE) 20 MG tablet  Take 2 tablets (40 mg total) by mouth once  "daily.                 Patient Instructions:    Discharge Procedure Orders  OXYGEN FOR HOME USE   Order Specific Question Answer Comments   Liter Flow 2    Duration Continuous    Qualifying SpO2: 83%    Testing done at: Rest    Device home concentrator with portable unit    Patient condition with qualifying saturation COPD    Height: 5' 1" (1.549 m)    Weight: 58.3 kg (128 lb 8.5 oz)    Does patient have medical equipment at home? nebulizer    Does patient have medical equipment at home? walker, rolling    Alternative treatment measures have been tried or considered and deemed clinically ineffective. Yes    Vendor: [facility] Orders given to Salud   Expected Date of Delivery: 2/8/2018      Activity as tolerated     Notify your health care provider if you experience any of the following:  difficulty breathing or increased cough         At the time of discharge patient was told to take all medications as prescribed, to keep all followup appointments, and to call their primary care physician or return to the emergency room if they have any worsening or concerning symptoms.    Signing Physician:  Cherie Manrique MD  "

## 2018-02-08 NOTE — NURSING
Patient expressed nervousness being discharged with oxygen.  Educated patient to not smoke with oxygen and to avoid other smokers with oxygen on.  Educated patient to avoid all petroleum-based products. Recommended patient sleep with oxygen in place, remove oxygen when bathing, and to work with oxygen supply company to ensure tanks are full. Patient verbalized understanding of all instructions.  Daughter is on her way to  patient.

## 2018-02-08 NOTE — PLAN OF CARE
Problem: Patient Care Overview  Goal: Plan of Care Review  Outcome: Ongoing (interventions implemented as appropriate)  Reviewed plan of care with patient.  Critical CO2 = 46.  Home oxygen assessment will be completed.  Patient will remain free of fall/trauma/injury by using appropriate lighting, nonskid socks, and by keeping area free of debris. Patient verbalized understanding of all instructions.

## 2018-02-08 NOTE — NURSING
Received and acknowledge discharge instructions ordered at 1421 hours.  Ordered to hold discharged for oxygen delivery.  Provided a copy of AVS to patient.  Discharge instructions explained to patient.   Discontinued PIV left antecubital.  Tip intact.  Removed telemetry.  Informed patient of future follow-up appointments.  Administered all ordered medications up to and including 1700 hours.  Explained medication regime to include new, continued, and discontinued medications.  Patient informed when next dose is due for all medications. Completed MIS.  Discussed when to call the doctor. Discussed heart failure tips, diagnosing heart failure, treatment plan, diet, procedures, tracking weight, signs of a flare-up to include:  swelling, shortness of breath dizziness, chest pain and cough.  Heart Failure education was completed.  Patient acknowledged understanding of all instructions. Will continue to monitor patient until off unit.

## 2018-02-08 NOTE — NURSING
Patient is alert and oriented x 4.  Patient is on BIPAP SPO2 = 93%.  Patient is complaining of headache and discomfort from wearing oxygen and BIPAP.  Administered butalbital-acetaminophen-caffeine -40 mg tablet oral q4h PRN headache.  Placed patient on 2L nasal cannula.  Patient complaining of right leg pain.  VALERIE Manrique. notified.

## 2018-02-09 ENCOUNTER — PATIENT OUTREACH (OUTPATIENT)
Dept: ADMINISTRATIVE | Facility: CLINIC | Age: 63
End: 2018-02-09

## 2018-02-09 NOTE — PATIENT INSTRUCTIONS

## 2018-02-09 NOTE — NURSING
Patient stated she had no personal belongings that needed to be returned.  At 1800 hours patient stated that the ED secured her medications and placed them in a plastic bag.  Bedside looked in pyxis, personal bin, and locked key box in [facility].  There were no medications or notes to indicate where medications are located.

## 2018-04-17 DIAGNOSIS — J44.9 COPD (CHRONIC OBSTRUCTIVE PULMONARY DISEASE): Primary | ICD-10-CM

## 2018-04-25 ENCOUNTER — HOSPITAL ENCOUNTER (OUTPATIENT)
Dept: PULMONOLOGY | Facility: HOSPITAL | Age: 63
Discharge: HOME | End: 2018-04-25
Attending: INTERNAL MEDICINE
Payer: MEDICAID

## 2018-04-25 DIAGNOSIS — J44.9 COPD (CHRONIC OBSTRUCTIVE PULMONARY DISEASE): ICD-10-CM

## 2018-04-25 PROCEDURE — 94640 AIRWAY INHALATION TREATMENT: CPT

## 2018-04-25 PROCEDURE — 94729 DIFFUSING CAPACITY: CPT

## 2018-04-25 PROCEDURE — 94726 PLETHYSMOGRAPHY LUNG VOLUMES: CPT

## 2018-04-25 PROCEDURE — 94010 BREATHING CAPACITY TEST: CPT

## 2018-04-27 NOTE — PROCEDURES
Pulmonary Function Testing    The results of this test meet ATS standards for acceptability and reproducibility.    There is very severe airflow obstruction with evidence of small airways disease.   Non-significant bronchodilator response.  Lung volumes are increased, consistent with hyperinflation and air trapping.   Diffusion is very severely impaired.    Clinical correlation is recommended.    Collin Nunez MD  Fellow, [facility]

## 2018-12-10 ENCOUNTER — HOSPITAL ENCOUNTER (EMERGENCY)
Facility: HOSPITAL | Age: 63
Discharge: HOME | End: 2018-12-10
Attending: EMERGENCY MEDICINE
Payer: MEDICAID

## 2018-12-10 VITALS
SYSTOLIC BLOOD PRESSURE: 175 MMHG | TEMPERATURE: 99 F | OXYGEN SATURATION: 95 % | DIASTOLIC BLOOD PRESSURE: 84 MMHG | HEIGHT: 61 IN | WEIGHT: 114 LBS | RESPIRATION RATE: 18 BRPM | HEART RATE: 72 BPM | BODY MASS INDEX: 21.52 KG/M2

## 2018-12-10 DIAGNOSIS — S80.02XA CONTUSION OF LEFT KNEE, INITIAL ENCOUNTER: ICD-10-CM
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
## 2018-12-10 DIAGNOSIS — S40.011A CONTUSION OF RIGHT SHOULDER, INITIAL ENCOUNTER: ICD-10-CM

## 2018-12-10 PROCEDURE — 63600175 PHARM REV CODE 636 W HCPCS

## 2018-12-10 PROCEDURE — 96372 THER/PROPH/DIAG INJ SC/IM: CPT

## 2018-12-10 PROCEDURE — 99284 EMERGENCY DEPT VISIT MOD MDM: CPT | Mod: 25

## 2018-12-10 PROCEDURE — 99284 EMERGENCY DEPT VISIT MOD MDM: CPT | Mod: ,,,

## 2018-12-10 RX ORDER — ORPHENADRINE CITRATE 30 MG/ML
60 INJECTION INTRAMUSCULAR; INTRAVENOUS
Status: COMPLETED | OUTPATIENT
Start: 2018-12-10 | End: 2018-12-10

## 2018-12-10 RX ORDER — TIZANIDINE 4 MG/1
4 TABLET ORAL EVERY 8 HOURS
Qty: 20 TABLET | Refills: 0 | OUTPATIENT
Start: 2018-12-10 | End: 2018-12-10 | Stop reason: SDUPTHER

## 2018-12-10 RX ORDER — TIZANIDINE 4 MG/1
4 TABLET ORAL EVERY 8 HOURS
Qty: 20 TABLET | Refills: 0 | Status: SHIPPED | OUTPATIENT
Start: 2018-12-10 | End: 2019-10-20

## 2018-12-10 RX ORDER — BUPROPION HYDROCHLORIDE 300 MG/1
300 TABLET ORAL DAILY
COMMUNITY
End: 2019-10-20

## 2018-12-10 RX ADMIN — ORPHENADRINE CITRATE 60 MG: 30 INJECTION INTRAMUSCULAR; INTRAVENOUS at 03:12

## 2018-12-10 NOTE — DISCHARGE INSTRUCTIONS
Please take tylenol for pain. May also take zanaflex every 8 hours for muscle aches.     Our goal in the emergency department is to always give you outstanding care and exceptional service. You may receive a survey by mail or e-mail in the next week regarding your experience in our ED. We would greatly appreciate your completing and returning the survey. Your feedback provides us with a way to recognize our staff who give very good care and it helps us learn how to improve when your experience was below our aspiration of excellence.

## 2018-12-10 NOTE — ED PROVIDER NOTES
Encounter Date: 12/10/2018    SCRIBE #1 NOTE: I, Son Jacinda, am scribing for, and in the presence of,  Dr. Trujillo . I have scribed the following portions of the note - the APC attestation.       History     Chief Complaint   Patient presents with    Fall     fell last night , pain to L knee, r shoulder and r leg     63-year-old female with medical history of COPD, depression, hypertension, hyperlipidemia, CHF and arthritis presents the ED after mechanical fall.  Patient states that she got tangled up in her oxygen and fell on her right shoulder and left knee.  She denies hitting her head or loss of consciousness.  States taking Tylenol for the pain with mild relief.  She denies nausea, vomiting, chest pain, shortness of breath, fever, chills, vision changes, abdominal pain, back pain, urinary retention, bowel incontinence, saddle paresthesias, headache, vision changes, lightheadedness, dizziness.          Review of patient's allergies indicates:  No Known Allergies  Past Medical History:   Diagnosis Date    Arthritis     Asthma     Bronchitis     CHF (congestive heart failure)     COPD (chronic obstructive pulmonary disease)     Depression     Hyperlipidemia     Hypertension      Past Surgical History:   Procedure Laterality Date    CHOLECYSTECTOMY      EYE SURGERY      OVARIAN CYST REMOVAL       History reviewed. No pertinent family history.  Social History     Tobacco Use    Smoking status: Current Every Day Smoker     Types: Cigarettes   Substance Use Topics    Alcohol use: Yes    Drug use: No     Review of Systems   Constitutional: Negative for chills, diaphoresis, fatigue and fever.   Eyes: Negative for visual disturbance.   Respiratory: Negative for shortness of breath.    Cardiovascular: Negative for chest pain.   Gastrointestinal: Negative for abdominal pain, nausea and vomiting.   Musculoskeletal: Positive for arthralgias. Negative for back pain, joint swelling and myalgias.   Skin: Negative  for rash and wound.   Neurological: Negative for syncope, weakness, light-headedness and headaches.   Hematological: Does not bruise/bleed easily.   Psychiatric/Behavioral: The patient is not nervous/anxious.        Physical Exam     Initial Vitals [12/10/18 1512]   BP Pulse Resp Temp SpO2   (!) 175/84 72 18 98.6 °F (37 °C) 95 %      MAP       --         Physical Exam    Vitals reviewed.  Constitutional: Vital signs are normal. She appears well-developed and well-nourished. She is not diaphoretic. No distress.   HENT:   Head: Normocephalic and atraumatic.   Nose: Nose normal.   Eyes: Conjunctivae, EOM and lids are normal. Pupils are equal, round, and reactive to light. Lids are everted and swept, no foreign bodies found.   Neck: Trachea normal and normal range of motion. Neck supple.   Cardiovascular: Normal rate, regular rhythm and normal pulses.   Pulmonary/Chest: She has no wheezes. She has no rhonchi. She has no rales. She exhibits no tenderness.   Abdominal: Soft. Normal appearance and bowel sounds are normal. There is no tenderness. There is no rebound and no guarding.   Musculoskeletal: Normal range of motion. She exhibits tenderness. She exhibits no edema.        Right shoulder: She exhibits tenderness and bony tenderness. She exhibits normal range of motion, no swelling, no effusion, no crepitus, no deformity, normal pulse and normal strength.        Left shoulder: She exhibits normal range of motion, no tenderness, no bony tenderness, no swelling, no crepitus and no deformity.        Right hip: She exhibits normal range of motion, normal strength, no tenderness and no bony tenderness.        Left hip: She exhibits normal range of motion, normal strength, no tenderness and no bony tenderness.        Right knee: She exhibits normal range of motion, no swelling and no effusion. No tenderness found.        Left knee: She exhibits ecchymosis and bony tenderness. She exhibits normal range of motion, no swelling,  no effusion and no erythema. No tenderness found.        Right ankle: She exhibits normal range of motion. No tenderness. Achilles tendon exhibits no pain, no defect and normal Galindo's test results.        Left ankle: She exhibits normal range of motion. No tenderness. Achilles tendon exhibits no pain, no defect and normal Galindo's test results.        Right upper leg: She exhibits no tenderness and no bony tenderness.        Left upper leg: She exhibits no tenderness and no bony tenderness.        Right lower leg: She exhibits no tenderness and no bony tenderness.        Left lower leg: She exhibits no tenderness and no bony tenderness.        Right foot: There is normal range of motion, no tenderness and no bony tenderness.        Left foot: There is normal range of motion, no tenderness and no bony tenderness.   Patient able to ambulate and bear weight with no complications. Bruising noted to left anterior knee. Patient able to fully extend and fully flex knee. Patellar tendon intact.   Neurological: She is alert and oriented to person, place, and time. She has normal strength. No cranial nerve deficit or sensory deficit.   Skin: Skin is warm. Capillary refill takes less than 2 seconds. No cyanosis.   Psychiatric: She has a normal mood and affect.         ED Course   Procedures  Labs Reviewed - No data to display       Imaging Results          X-Ray Knee 1 or 2 View Left (Final result)  Result time 12/10/18 16:33:05    Final result by Matti Lord MD (12/10/18 16:33:05)                 Impression:      No acute displaced fracture-dislocation identified.      Electronically signed by: Matti Lord MD  Date:    12/10/2018  Time:    16:33             Narrative:    EXAMINATION:  XR KNEE 1 OR 2 VIEW LEFT    CLINICAL HISTORY:  Unspecified fall, initial encounter    TECHNIQUE:  AP and lateral views left knee    COMPARISON:  Bilateral knee series 08/09/2015    FINDINGS:  Generalized osteopenia.  Overall alignment  is within normal limits.  No displaced fracture, dislocation or destructive osseous process.  No large suprapatellar joint effusion.  Minimal spurring of the posterior patella.  No subcutaneous emphysema or radiodense retained foreign body.                               X-Ray Shoulder Complete 2 View Right (Final result)  Result time 12/10/18 16:33:53    Final result by Matti Lord MD (12/10/18 16:33:53)                 Impression:      No acute displaced fracture-dislocation identified.      Electronically signed by: Matti Lord MD  Date:    12/10/2018  Time:    16:33             Narrative:    EXAMINATION:  XR SHOULDER COMPLETE 2 OR MORE VIEWS RIGHT    CLINICAL HISTORY:  Unspecified fall, initial encounter    TECHNIQUE:  Two or three views of the right shoulder were performed.    COMPARISON:  Chest radiograph 02/05/2018    FINDINGS:  Generalized osteopenia.  Overall alignment is within normal limits.  No displaced fracture, dislocation or destructive osseous process.  Mild degenerative change at the AC joint and inferior glenoid.  No subcutaneous emphysema or radiodense retained foreign body.  No right apical pneumothorax.                                 Medical Decision Making:   History:   Old Medical Records: I decided to obtain old medical records.  Old Records Summarized: records from clinic visits.  Initial Assessment:   63-year-old female with medical history of COPD, depression, hypertension, hyperlipidemia, CHF and arthritis presents the ED after mechanical fall. Patient fell around 8a today. Reports hitting right shoulder and left knee. +bruising to left knee. Able to fully extend and flex knee. Patellar tendon intact. Right shoulder ROM intact. + tenderness to anterior shoulder. Strength, sensation and distal pulses intact. Cap refill < 2 sec. No midline or spinous process tenderness. Patient able to ambulate and bear weight. No tenderness to pelvis.   Differential Diagnosis:   DDX includes but is  not limited to contusion vs fracture vs dislocation, muscle strain, radiculopathy.  Independently Interpreted Test(s):   I have ordered and independently interpreted X-rays - see summary below.  Clinical Tests:   Radiological Study: Ordered and Reviewed  ED Management:  Will order xray of left knee and right shoulder and give IM norflex 60mg. Patient reports taking tylenol pta.    No evidence of fracture on xray. Patient reports improvement in pain with norflex. ROM remains intact. Able to ambulate and bear weight. No ataxia. Will discharge home with PCP follow up in 2 days and zanaflex. Patient is in agreement with plan. Discharged to home in stable condition, return to ED warnings given, follow up and patient care instructions given.      I have discussed the treatment and management of this patient with my supervisory physician, and we agree on the plan of care.               Scribe Attestation:   Scribe #1: I performed the above scribed service and the documentation accurately describes the services I performed. I attest to the accuracy of the note.    Attending Attestation:     Physician Attestation Statement for NP/PA:   I discussed this assessment and plan of this patient with the NP/PA, but I did not personally examine the patient. The face to face encounter was performed by the NP/PA.                     Clinical Impression:   The primary encounter diagnosis was Fall. Diagnoses of Contusion of left knee, initial encounter and Contusion of right shoulder, initial encounter were also pertinent to this visit.      Disposition:   Disposition: Discharged  Condition: Stable                        Bharti Walters PA-C  12/10/18 1800

## 2018-12-10 NOTE — ED NOTES
Appearance:  Pt awake, alert & oriented to person, place & time.  Pt in no acute distress at present time.  Skin:  Skin warm, dry & intact.  Mucous membranes moist.  Skin turgor normal.  Respiratory:  Respirations even, non-labored.    Neurologic:  Pt moving all extremities without difficulty.  Sensation intact.     Peripheral Vascular:  All peripheral pulses present.  Musculoskeletal:  Bruising to left knee.  Pain with right shoulder ROM.  No tenderness to right hip area.

## 2018-12-10 NOTE — ED TRIAGE NOTES
"Pt states she "got tangled in her oxygen cord" and fell around 9am.  Pt denies hitting head, denies LOC.  Pt c/o left knee pain, right shoulder and left hip/leg pain.    "

## 2019-10-20 ENCOUNTER — HOSPITAL ENCOUNTER (EMERGENCY)
Facility: HOSPITAL | Age: 64
Discharge: HOME | End: 2019-10-20
Attending: EMERGENCY MEDICINE
Payer: MEDICAID

## 2019-10-20 VITALS
TEMPERATURE: 98 F | HEIGHT: 62 IN | HEART RATE: 90 BPM | OXYGEN SATURATION: 100 % | BODY MASS INDEX: 17.66 KG/M2 | DIASTOLIC BLOOD PRESSURE: 86 MMHG | SYSTOLIC BLOOD PRESSURE: 161 MMHG | WEIGHT: 96 LBS | RESPIRATION RATE: 22 BRPM

## 2019-10-20 DIAGNOSIS — R53.83 FATIGUE: ICD-10-CM

## 2019-10-20 DIAGNOSIS — J44.9 CHRONIC OBSTRUCTIVE PULMONARY DISEASE, UNSPECIFIED COPD TYPE: Primary | ICD-10-CM

## 2019-10-20 LAB
ALBUMIN SERPL BCP-MCNC: 3.9 G/DL (ref 3.5–5.2)
ALP SERPL-CCNC: 68 U/L (ref 55–135)
ALT SERPL W/O P-5'-P-CCNC: 14 U/L (ref 10–44)
ANION GAP SERPL CALC-SCNC: 8 MMOL/L (ref 8–16)
AST SERPL-CCNC: 19 U/L (ref 10–40)
BASOPHILS # BLD AUTO: 0.01 K/UL (ref 0–0.2)
BASOPHILS NFR BLD: 0.2 % (ref 0–1.9)
BILIRUB SERPL-MCNC: 0.2 MG/DL (ref 0.1–1)
BILIRUB UR QL STRIP: NEGATIVE
BUN SERPL-MCNC: 14 MG/DL (ref 8–23)
CALCIUM SERPL-MCNC: 9.9 MG/DL (ref 8.7–10.5)
CHLORIDE SERPL-SCNC: 92 MMOL/L (ref 95–110)
CK SERPL-CCNC: 76 U/L (ref 20–180)
CLARITY UR: CLEAR
CO2 SERPL-SCNC: 40 MMOL/L (ref 23–29)
COLOR UR: YELLOW
CREAT SERPL-MCNC: 0.9 MG/DL (ref 0.5–1.4)
DIFFERENTIAL METHOD: ABNORMAL
EOSINOPHIL # BLD AUTO: 0.1 K/UL (ref 0–0.5)
EOSINOPHIL NFR BLD: 1.2 % (ref 0–8)
ERYTHROCYTE [DISTWIDTH] IN BLOOD BY AUTOMATED COUNT: 13.6 % (ref 11.5–14.5)
EST. GFR  (AFRICAN AMERICAN): >60 ML/MIN/1.73 M^2
EST. GFR  (NON AFRICAN AMERICAN): >60 ML/MIN/1.73 M^2
GLUCOSE SERPL-MCNC: 122 MG/DL (ref 70–110)
GLUCOSE UR QL STRIP: NEGATIVE
HCT VFR BLD AUTO: 43.7 % (ref 37–48.5)
HGB BLD-MCNC: 13.4 G/DL (ref 12–16)
HGB UR QL STRIP: ABNORMAL
KETONES UR QL STRIP: NEGATIVE
LEUKOCYTE ESTERASE UR QL STRIP: NEGATIVE
LYMPHOCYTES # BLD AUTO: 0.7 K/UL (ref 1–4.8)
LYMPHOCYTES NFR BLD: 11.9 % (ref 18–48)
MAGNESIUM SERPL-MCNC: 2 MG/DL (ref 1.6–2.6)
MCH RBC QN AUTO: 31.6 PG (ref 27–31)
MCHC RBC AUTO-ENTMCNC: 30.7 G/DL (ref 32–36)
MCV RBC AUTO: 103 FL (ref 82–98)
MONOCYTES # BLD AUTO: 0.3 K/UL (ref 0.3–1)
MONOCYTES NFR BLD: 4.7 % (ref 4–15)
NEUTROPHILS # BLD AUTO: 4.8 K/UL (ref 1.8–7.7)
NEUTROPHILS NFR BLD: 82 % (ref 38–73)
NITRITE UR QL STRIP: NEGATIVE
PH UR STRIP: 6 [PH] (ref 5–8)
PLATELET # BLD AUTO: 185 K/UL (ref 150–350)
PMV BLD AUTO: 11.1 FL (ref 9.2–12.9)
POTASSIUM SERPL-SCNC: 5.2 MMOL/L (ref 3.5–5.1)
PROT SERPL-MCNC: 7.2 G/DL (ref 6–8.4)
PROT UR QL STRIP: ABNORMAL
RBC # BLD AUTO: 4.24 M/UL (ref 4–5.4)
SODIUM SERPL-SCNC: 140 MMOL/L (ref 136–145)
SP GR UR STRIP: 1.02 (ref 1–1.03)
TROPONIN I SERPL DL<=0.01 NG/ML-MCNC: 0.01 NG/ML (ref 0–0.03)
TSH SERPL DL<=0.005 MIU/L-ACNC: 1.1 UIU/ML (ref 0.4–4)
URN SPEC COLLECT METH UR: ABNORMAL
UROBILINOGEN UR STRIP-ACNC: NEGATIVE EU/DL
WBC # BLD AUTO: 5.8 K/UL (ref 3.9–12.7)

## 2019-10-20 PROCEDURE — 81003 URINALYSIS AUTO W/O SCOPE: CPT

## 2019-10-20 PROCEDURE — 80053 COMPREHEN METABOLIC PANEL: CPT

## 2019-10-20 PROCEDURE — 93010 ELECTROCARDIOGRAM REPORT: CPT | Mod: ,,, | Performed by: INTERNAL MEDICINE

## 2019-10-20 PROCEDURE — 85025 COMPLETE CBC W/AUTO DIFF WBC: CPT

## 2019-10-20 PROCEDURE — 83735 ASSAY OF MAGNESIUM: CPT

## 2019-10-20 PROCEDURE — 63600175 PHARM REV CODE 636 W HCPCS: Performed by: EMERGENCY MEDICINE

## 2019-10-20 PROCEDURE — 99285 EMERGENCY DEPT VISIT HI MDM: CPT | Mod: 25

## 2019-10-20 PROCEDURE — 93010 EKG 12-LEAD: ICD-10-PCS | Mod: ,,, | Performed by: INTERNAL MEDICINE

## 2019-10-20 PROCEDURE — 93005 ELECTROCARDIOGRAM TRACING: CPT

## 2019-10-20 PROCEDURE — 96360 HYDRATION IV INFUSION INIT: CPT

## 2019-10-20 PROCEDURE — 82550 ASSAY OF CK (CPK): CPT

## 2019-10-20 PROCEDURE — 84484 ASSAY OF TROPONIN QUANT: CPT

## 2019-10-20 PROCEDURE — 84443 ASSAY THYROID STIM HORMONE: CPT

## 2019-10-20 RX ORDER — FUROSEMIDE 40 MG/1
TABLET ORAL
COMMUNITY

## 2019-10-20 RX ORDER — LOSARTAN POTASSIUM 100 MG/1
50 TABLET ORAL DAILY
Status: ON HOLD | COMMUNITY
End: 2019-11-11 | Stop reason: HOSPADM

## 2019-10-20 RX ADMIN — SODIUM CHLORIDE 250 ML: 0.9 INJECTION, SOLUTION INTRAVENOUS at 05:10

## 2019-10-20 NOTE — ED PROVIDER NOTES
Encounter Date: 10/20/2019    SCRIBE #1 NOTE: I, Van Phillips, am scribing for, and in the presence of,  Dr. Martell. I have scribed the entire note.       History     Chief Complaint   Patient presents with    Shortness of Breath     Presents awake, alert with c/o generally not feeling well. States she feels SOB but no worse than usual. Respirations unlabored with nasal O2 in progress per home use. Denies pain. Denies URI symptoms or fever.      Time seen by provider: 3:18 PM    This is a 64 y.o. female who presents with complaint of generalized weakness and fatigue beginning today. She reports chronic SOB consistent with her prior history of COPD, asthma, and CHF. She reports a recent productive cough with green sputum and midsternal chest tightness. Patient denies any fever, chills, CP, nausea, vomiting, abdominal pain, or HA. Of note, the patient is a daily smoker.    The history is provided by the patient.     Review of patient's allergies indicates:  No Known Allergies  Past Medical History:   Diagnosis Date    Arthritis     Asthma     Bronchitis     CHF (congestive heart failure)     COPD (chronic obstructive pulmonary disease)     Depression     Hyperlipidemia     Hypertension      Past Surgical History:   Procedure Laterality Date    CHOLECYSTECTOMY      EYE SURGERY      OVARIAN CYST REMOVAL       History reviewed. No pertinent family history.  Social History     Tobacco Use    Smoking status: Current Every Day Smoker     Types: Cigarettes   Substance Use Topics    Alcohol use: Yes    Drug use: No     Review of Systems   Constitutional: Positive for fatigue.   Respiratory: Positive for cough, chest tightness and shortness of breath.    Neurological: Positive for weakness (generalized).   All other systems reviewed and are negative.      Physical Exam     Initial Vitals [10/20/19 1511]   BP Pulse Resp Temp SpO2   (!) 154/90 (!) 115 20 98 °F (36.7 °C) (!) 94 %      MAP       --         Physical  Exam    Nursing note and vitals reviewed.  Constitutional: She appears well-developed and well-nourished. She is not diaphoretic. No distress.   HENT:   Head: Normocephalic and atraumatic.   Eyes: Conjunctivae and EOM are normal.   Normal conjunctiva   Neck: Normal range of motion. Neck supple.   Cardiovascular: Normal rate, regular rhythm and normal heart sounds.   Pulmonary/Chest: No respiratory distress.   Greatly diminished breath sounds bilaterally   Abdominal: Soft. There is no tenderness.   Musculoskeletal: Normal range of motion. She exhibits no edema or tenderness.   No LE edema   Neurological: She is alert and oriented to person, place, and time. She has normal strength.   Skin: Skin is warm and dry. Capillary refill takes less than 2 seconds.         ED Course   Procedures  Labs Reviewed   CBC W/ AUTO DIFFERENTIAL - Abnormal; Notable for the following components:       Result Value    Mean Corpuscular Volume 103 (*)     Mean Corpuscular Hemoglobin 31.6 (*)     Mean Corpuscular Hemoglobin Conc 30.7 (*)     Lymph # 0.7 (*)     Gran% 82.0 (*)     Lymph% 11.9 (*)     All other components within normal limits   COMPREHENSIVE METABOLIC PANEL - Abnormal; Notable for the following components:    Potassium 5.2 (*)     Chloride 92 (*)     CO2 40 (*)     Glucose 122 (*)     All other components within normal limits   URINALYSIS - Abnormal; Notable for the following components:    Protein, UA Trace (*)     Occult Blood UA Trace (*)     All other components within normal limits   CK   TROPONIN I   MAGNESIUM   TSH     EKG Readings: (Independently Interpreted)   Initial Reading: No STEMI. Rhythm: Normal Sinus Rhythm. Heart Rate: 99.   No ST elevations  Normal T waves       Imaging Results          X-Ray Chest 1 View (Final result)  Result time 10/20/19 16:03:57    Final result by Justo Murry MD (10/20/19 16:03:57)                 Impression:      As above.      Electronically signed by: Justo Murry  MD  Date:    10/20/2019  Time:    16:03             Narrative:    EXAMINATION:  XR CHEST 1 VIEW    CLINICAL HISTORY:  fatigue;    TECHNIQUE:  Single frontal view of the chest was performed.    FINDINGS:  The lungs appear hyperexpanded suggestive of emphysema.  There is no pneumothorax or pleural fluid.  The cardiac silhouette is prominent.  There is a dextroscoliosis of the thoracic spine.                                 Medical Decision Making:   Clinical Tests:   Lab Tests: Ordered and Reviewed  Radiological Study: Ordered and Reviewed  Medical Tests: Ordered and Reviewed  ED Management:  64-year-old female with COPD who complains of generalized weakness and fatigue.  She has a chronic cough and shortness of breath.  Patient continues to smoke.  Chest x-ray shows no infiltrates.  She has no wheezing on exam.  Lab work is unremarkable. I have suggested she stop smoking and follow up with the primary physician as soon as able for recheck, but most importantly to return to the ED for any worsening of her condition.                      Clinical Impression:       ICD-10-CM ICD-9-CM   1. Chronic obstructive pulmonary disease, unspecified COPD type J44.9 496   2. Fatigue R53.83 780.79       Disposition:   Disposition: Discharged  Condition: Stable      I, Dr. Dong Damico, personally performed the services described in this documentation. All medical record entries made by the scribe were at my direction and in my presence. I have reviewed the chart and agree that the record reflects my personal performance and is accurate and complete. Dong Damico MD.  7:39 PM 10/20/2019       Dong Damico MD  10/20/19 1941

## 2019-10-20 NOTE — ED NOTES
"Pt here c/o "I feel bad today" denies change in breathing, states does have chest pain-same as her normal chest pain, + green sputum x 1 week, denies abd pain, denies urinary s/s or diarrhea or bowel changes. Son reports more confusion than normal. Breath sounds are diminished throughout, pt with nonproductive coughing in ER. No edema to lower ext. Pt is alert, responds appropriately but is vague  of s/s.   "

## 2019-11-07 ENCOUNTER — HOSPITAL ENCOUNTER (INPATIENT)
Facility: HOSPITAL | Age: 64
LOS: 5 days | Discharge: HOME | DRG: 189 | End: 2019-11-12
Attending: EMERGENCY MEDICINE | Admitting: HOSPITALIST
Payer: MEDICAID

## 2019-11-07 DIAGNOSIS — J44.1 COPD WITH ACUTE EXACERBATION: ICD-10-CM

## 2019-11-07 DIAGNOSIS — J44.1 COPD EXACERBATION: Primary | ICD-10-CM

## 2019-11-07 DIAGNOSIS — R06.02 SOB (SHORTNESS OF BREATH): ICD-10-CM

## 2019-11-07 DIAGNOSIS — I50.30 DIASTOLIC HEART FAILURE: ICD-10-CM

## 2019-11-07 LAB
ALBUMIN SERPL BCP-MCNC: 3.2 G/DL (ref 3.5–5.2)
ALP SERPL-CCNC: 62 U/L (ref 55–135)
ALT SERPL W/O P-5'-P-CCNC: 15 U/L (ref 10–44)
ANION GAP SERPL CALC-SCNC: 9 MMOL/L (ref 8–16)
AST SERPL-CCNC: 18 U/L (ref 10–40)
BASOPHILS # BLD AUTO: 0.04 K/UL (ref 0–0.2)
BASOPHILS NFR BLD: 0.5 % (ref 0–1.9)
BILIRUB SERPL-MCNC: 0.2 MG/DL (ref 0.1–1)
BNP SERPL-MCNC: 41 PG/ML (ref 0–99)
BUN SERPL-MCNC: 18 MG/DL (ref 8–23)
BUN SERPL-MCNC: 21 MG/DL (ref 6–30)
CALCIUM SERPL-MCNC: 9.3 MG/DL (ref 8.7–10.5)
CHLORIDE SERPL-SCNC: 88 MMOL/L (ref 95–110)
CHLORIDE SERPL-SCNC: 90 MMOL/L (ref 95–110)
CO2 SERPL-SCNC: 42 MMOL/L (ref 23–29)
CREAT SERPL-MCNC: 0.9 MG/DL (ref 0.5–1.4)
CREAT SERPL-MCNC: 1 MG/DL (ref 0.5–1.4)
DIFFERENTIAL METHOD: ABNORMAL
EOSINOPHIL # BLD AUTO: 0.1 K/UL (ref 0–0.5)
EOSINOPHIL NFR BLD: 1.6 % (ref 0–8)
ERYTHROCYTE [DISTWIDTH] IN BLOOD BY AUTOMATED COUNT: 12.4 % (ref 11.5–14.5)
EST. GFR  (AFRICAN AMERICAN): >60 ML/MIN/1.73 M^2
EST. GFR  (NON AFRICAN AMERICAN): >60 ML/MIN/1.73 M^2
GLUCOSE SERPL-MCNC: 142 MG/DL (ref 70–110)
GLUCOSE SERPL-MCNC: 182 MG/DL (ref 70–110)
HCT VFR BLD AUTO: 42.6 % (ref 37–48.5)
HCT VFR BLD CALC: 37 %PCV (ref 36–54)
HGB BLD-MCNC: 12.7 G/DL (ref 12–16)
IMM GRANULOCYTES # BLD AUTO: 0.01 K/UL (ref 0–0.04)
IMM GRANULOCYTES NFR BLD AUTO: 0.1 % (ref 0–0.5)
LACTATE SERPL-SCNC: 1.1 MMOL/L (ref 0.5–2.2)
LYMPHOCYTES # BLD AUTO: 1.5 K/UL (ref 1–4.8)
LYMPHOCYTES NFR BLD: 20.5 % (ref 18–48)
MCH RBC QN AUTO: 31.6 PG (ref 27–31)
MCHC RBC AUTO-ENTMCNC: 29.8 G/DL (ref 32–36)
MCV RBC AUTO: 106 FL (ref 82–98)
MONOCYTES # BLD AUTO: 0.6 K/UL (ref 0.3–1)
MONOCYTES NFR BLD: 8 % (ref 4–15)
NEUTROPHILS # BLD AUTO: 5.2 K/UL (ref 1.8–7.7)
NEUTROPHILS NFR BLD: 69.3 % (ref 38–73)
NRBC BLD-RTO: 0 /100 WBC
PLATELET # BLD AUTO: 197 K/UL (ref 150–350)
PMV BLD AUTO: 11 FL (ref 9.2–12.9)
POC IONIZED CALCIUM: 1.23 MMOL/L (ref 1.06–1.42)
POC TCO2 (MEASURED): 46 MMOL/L (ref 23–29)
POTASSIUM BLD-SCNC: 5.1 MMOL/L (ref 3.5–5.1)
POTASSIUM SERPL-SCNC: 5 MMOL/L (ref 3.5–5.1)
PROT SERPL-MCNC: 6.4 G/DL (ref 6–8.4)
RBC # BLD AUTO: 4.02 M/UL (ref 4–5.4)
SAMPLE: ABNORMAL
SODIUM BLD-SCNC: 137 MMOL/L (ref 136–145)
SODIUM SERPL-SCNC: 141 MMOL/L (ref 136–145)
TROPONIN I SERPL DL<=0.01 NG/ML-MCNC: 0.01 NG/ML (ref 0–0.03)
WBC # BLD AUTO: 7.48 K/UL (ref 3.9–12.7)

## 2019-11-07 PROCEDURE — 12000002 HC ACUTE/MED SURGE SEMI-PRIVATE ROOM

## 2019-11-07 PROCEDURE — 87040 BLOOD CULTURE FOR BACTERIA: CPT

## 2019-11-07 PROCEDURE — 80047 BASIC METABLC PNL IONIZED CA: CPT

## 2019-11-07 PROCEDURE — 81001 URINALYSIS AUTO W/SCOPE: CPT

## 2019-11-07 PROCEDURE — 94640 AIRWAY INHALATION TREATMENT: CPT

## 2019-11-07 PROCEDURE — 93010 EKG 12-LEAD: ICD-10-PCS | Mod: ,,, | Performed by: INTERNAL MEDICINE

## 2019-11-07 PROCEDURE — 63600175 PHARM REV CODE 636 W HCPCS: Performed by: PHYSICIAN ASSISTANT

## 2019-11-07 PROCEDURE — 83880 ASSAY OF NATRIURETIC PEPTIDE: CPT

## 2019-11-07 PROCEDURE — 82803 BLOOD GASES ANY COMBINATION: CPT

## 2019-11-07 PROCEDURE — 83605 ASSAY OF LACTIC ACID: CPT

## 2019-11-07 PROCEDURE — 94660 CPAP INITIATION&MGMT: CPT

## 2019-11-07 PROCEDURE — 94761 N-INVAS EAR/PLS OXIMETRY MLT: CPT

## 2019-11-07 PROCEDURE — 96365 THER/PROPH/DIAG IV INF INIT: CPT

## 2019-11-07 PROCEDURE — 25000242 PHARM REV CODE 250 ALT 637 W/ HCPCS: Performed by: PHYSICIAN ASSISTANT

## 2019-11-07 PROCEDURE — 85025 COMPLETE CBC W/AUTO DIFF WBC: CPT

## 2019-11-07 PROCEDURE — 99291 CRITICAL CARE FIRST HOUR: CPT | Mod: ,,, | Performed by: PHYSICIAN ASSISTANT

## 2019-11-07 PROCEDURE — 93010 ELECTROCARDIOGRAM REPORT: CPT | Mod: ,,, | Performed by: INTERNAL MEDICINE

## 2019-11-07 PROCEDURE — 27000190 HC CPAP FULL FACE MASK W/VALVE

## 2019-11-07 PROCEDURE — 99291 PR CRITICAL CARE, E/M 30-74 MINUTES: ICD-10-PCS | Mod: ,,, | Performed by: PHYSICIAN ASSISTANT

## 2019-11-07 PROCEDURE — 80053 COMPREHEN METABOLIC PANEL: CPT

## 2019-11-07 PROCEDURE — 87502 INFLUENZA DNA AMP PROBE: CPT

## 2019-11-07 PROCEDURE — 63600175 PHARM REV CODE 636 W HCPCS: Performed by: EMERGENCY MEDICINE

## 2019-11-07 PROCEDURE — 99900035 HC TECH TIME PER 15 MIN (STAT)

## 2019-11-07 PROCEDURE — 27000221 HC OXYGEN, UP TO 24 HOURS

## 2019-11-07 PROCEDURE — 84484 ASSAY OF TROPONIN QUANT: CPT

## 2019-11-07 PROCEDURE — 93005 ELECTROCARDIOGRAM TRACING: CPT

## 2019-11-07 PROCEDURE — 96367 TX/PROPH/DG ADDL SEQ IV INF: CPT

## 2019-11-07 PROCEDURE — 99291 CRITICAL CARE FIRST HOUR: CPT | Mod: 25

## 2019-11-07 RX ORDER — MAGNESIUM SULFATE HEPTAHYDRATE 40 MG/ML
2 INJECTION, SOLUTION INTRAVENOUS
Status: COMPLETED | OUTPATIENT
Start: 2019-11-07 | End: 2019-11-07

## 2019-11-07 RX ORDER — IPRATROPIUM BROMIDE AND ALBUTEROL SULFATE 2.5; .5 MG/3ML; MG/3ML
3 SOLUTION RESPIRATORY (INHALATION)
Status: DISCONTINUED | OUTPATIENT
Start: 2019-11-07 | End: 2019-11-07

## 2019-11-07 RX ORDER — ALBUTEROL SULFATE 2.5 MG/.5ML
2.5 SOLUTION RESPIRATORY (INHALATION)
Status: COMPLETED | OUTPATIENT
Start: 2019-11-07 | End: 2019-11-07

## 2019-11-07 RX ADMIN — ALBUTEROL SULFATE 2.5 MG: 2.5 SOLUTION RESPIRATORY (INHALATION) at 07:11

## 2019-11-07 RX ADMIN — ALBUTEROL SULFATE 2.5 MG: 2.5 SOLUTION RESPIRATORY (INHALATION) at 08:11

## 2019-11-07 RX ADMIN — MAGNESIUM SULFATE IN WATER 2 G: 40 INJECTION, SOLUTION INTRAVENOUS at 08:11

## 2019-11-07 RX ADMIN — AZITHROMYCIN MONOHYDRATE 500 MG: 500 INJECTION, POWDER, LYOPHILIZED, FOR SOLUTION INTRAVENOUS at 10:11

## 2019-11-08 PROBLEM — R23.0 PERIPHERAL CYANOSIS: Status: RESOLVED | Noted: 2018-02-05 | Resolved: 2019-11-08

## 2019-11-08 PROBLEM — R79.89 ELEVATED SERUM CREATININE: Status: RESOLVED | Noted: 2017-09-21 | Resolved: 2019-11-08

## 2019-11-08 PROBLEM — J96.21 ACUTE ON CHRONIC RESPIRATORY FAILURE WITH HYPOXIA AND HYPERCAPNIA: Status: ACTIVE | Noted: 2018-02-05

## 2019-11-08 PROBLEM — J96.02 ACUTE HYPERCAPNIC RESPIRATORY FAILURE: Status: ACTIVE | Noted: 2018-02-05

## 2019-11-08 PROBLEM — J96.22 ACUTE ON CHRONIC RESPIRATORY FAILURE WITH HYPOXIA AND HYPERCAPNIA: Status: ACTIVE | Noted: 2018-02-05

## 2019-11-08 PROBLEM — E87.5 HYPERKALEMIA: Status: RESOLVED | Noted: 2017-09-21 | Resolved: 2019-11-08

## 2019-11-08 PROBLEM — I50.30 (HFPEF) HEART FAILURE WITH PRESERVED EJECTION FRACTION: Status: ACTIVE | Noted: 2019-11-08

## 2019-11-08 PROBLEM — R09.02 HYPOXIA: Status: RESOLVED | Noted: 2018-02-05 | Resolved: 2019-11-08

## 2019-11-08 LAB
ADENOVIRUS: NOT DETECTED
ALBUMIN SERPL BCP-MCNC: 3.2 G/DL (ref 3.5–5.2)
ALLENS TEST: ABNORMAL
ALP SERPL-CCNC: 54 U/L (ref 55–135)
ALT SERPL W/O P-5'-P-CCNC: 14 U/L (ref 10–44)
ANION GAP SERPL CALC-SCNC: 6 MMOL/L (ref 8–16)
AST SERPL-CCNC: 18 U/L (ref 10–40)
BACTERIA #/AREA URNS AUTO: ABNORMAL /HPF
BASOPHILS # BLD AUTO: 0.01 K/UL (ref 0–0.2)
BASOPHILS NFR BLD: 0.2 % (ref 0–1.9)
BILIRUB SERPL-MCNC: 0.3 MG/DL (ref 0.1–1)
BILIRUB UR QL STRIP: NEGATIVE
BORDETELLA PARAPERTUSSIS (IS1001): NOT DETECTED
BORDETELLA PERTUSSIS (PTXP): NOT DETECTED
BUN SERPL-MCNC: 16 MG/DL (ref 8–23)
CALCIUM SERPL-MCNC: 9.2 MG/DL (ref 8.7–10.5)
CHLAMYDIA PNEUMONIAE: NOT DETECTED
CHLORIDE SERPL-SCNC: 95 MMOL/L (ref 95–110)
CLARITY UR REFRACT.AUTO: ABNORMAL
CO2 SERPL-SCNC: 40 MMOL/L (ref 23–29)
COLOR UR AUTO: YELLOW
CORONAVIRUS 229E, COMMON COLD VIRUS: NOT DETECTED
CORONAVIRUS HKU1, COMMON COLD VIRUS: NOT DETECTED
CORONAVIRUS NL63, COMMON COLD VIRUS: NOT DETECTED
CORONAVIRUS OC43, COMMON COLD VIRUS: NOT DETECTED
CREAT SERPL-MCNC: 0.8 MG/DL (ref 0.5–1.4)
D DIMER PPP IA.FEU-MCNC: 0.41 MG/L FEU
DELSYS: ABNORMAL
DIFFERENTIAL METHOD: ABNORMAL
EOSINOPHIL # BLD AUTO: 0 K/UL (ref 0–0.5)
EOSINOPHIL NFR BLD: 0 % (ref 0–8)
EP: 5
ERYTHROCYTE [DISTWIDTH] IN BLOOD BY AUTOMATED COUNT: 12.4 % (ref 11.5–14.5)
ERYTHROCYTE [SEDIMENTATION RATE] IN BLOOD BY WESTERGREN METHOD: 12 MM/H
EST. GFR  (AFRICAN AMERICAN): >60 ML/MIN/1.73 M^2
EST. GFR  (NON AFRICAN AMERICAN): >60 ML/MIN/1.73 M^2
ESTIMATED AVG GLUCOSE: 105 MG/DL (ref 68–131)
FIO2: 21
FIO2: 28
FIO2: 28
FIO2: 40
FIO2: 40
FLOW: 6
FLUBV RNA NPH QL NAA+NON-PROBE: NOT DETECTED
GLUCOSE SERPL-MCNC: 124 MG/DL (ref 70–110)
GLUCOSE UR QL STRIP: ABNORMAL
HBA1C MFR BLD HPLC: 5.3 % (ref 4–5.6)
HCO3 UR-SCNC: 43.2 MMOL/L (ref 24–28)
HCO3 UR-SCNC: 47.1 MMOL/L (ref 24–28)
HCO3 UR-SCNC: 48.1 MMOL/L (ref 24–28)
HCO3 UR-SCNC: 49.9 MMOL/L (ref 24–28)
HCO3 UR-SCNC: 52 MMOL/L (ref 24–28)
HCT VFR BLD AUTO: 38.8 % (ref 37–48.5)
HGB BLD-MCNC: 11.7 G/DL (ref 12–16)
HGB UR QL STRIP: NEGATIVE
HPIV1 RNA NPH QL NAA+NON-PROBE: NOT DETECTED
HPIV2 RNA NPH QL NAA+NON-PROBE: NOT DETECTED
HPIV3 RNA NPH QL NAA+NON-PROBE: NOT DETECTED
HPIV4 RNA NPH QL NAA+NON-PROBE: NOT DETECTED
HUMAN METAPNEUMOVIRUS: NOT DETECTED
HYALINE CASTS UR QL AUTO: 14 /LPF
IMM GRANULOCYTES # BLD AUTO: 0.02 K/UL (ref 0–0.04)
IMM GRANULOCYTES NFR BLD AUTO: 0.4 % (ref 0–0.5)
INFLUENZA A (SUBTYPES H1,H1-2009,H3): NOT DETECTED
IP: 10
IP: 15
IP: 15
KETONES UR QL STRIP: NEGATIVE
LDH SERPL L TO P-CCNC: 0.88 MMOL/L (ref 0.5–2.2)
LEUKOCYTE ESTERASE UR QL STRIP: ABNORMAL
LYMPHOCYTES # BLD AUTO: 0.6 K/UL (ref 1–4.8)
LYMPHOCYTES NFR BLD: 10.6 % (ref 18–48)
MAGNESIUM SERPL-MCNC: 2.4 MG/DL (ref 1.6–2.6)
MCH RBC QN AUTO: 31.4 PG (ref 27–31)
MCHC RBC AUTO-ENTMCNC: 30.2 G/DL (ref 32–36)
MCV RBC AUTO: 104 FL (ref 82–98)
MICROSCOPIC COMMENT: ABNORMAL
MIN VOL: 6.6
MIN VOL: 8.6
MIN VOL: 9.3
MODE: ABNORMAL
MONOCYTES # BLD AUTO: 0.2 K/UL (ref 0.3–1)
MONOCYTES NFR BLD: 3.6 % (ref 4–15)
MYCOPLASMA PNEUMONIAE: NOT DETECTED
NEUTROPHILS # BLD AUTO: 4.5 K/UL (ref 1.8–7.7)
NEUTROPHILS NFR BLD: 85.2 % (ref 38–73)
NITRITE UR QL STRIP: NEGATIVE
NRBC BLD-RTO: 0 /100 WBC
PCO2 BLDA: 114.8 MMHG (ref 35–45)
PCO2 BLDA: 62.5 MMHG (ref 35–45)
PCO2 BLDA: 85.9 MMHG (ref 35–45)
PCO2 BLDA: 88.3 MMHG (ref 35–45)
PCO2 BLDA: 98.5 MMHG (ref 35–45)
PH SMN: 7.26 [PH] (ref 7.35–7.45)
PH SMN: 7.31 [PH] (ref 7.35–7.45)
PH SMN: 7.34 [PH] (ref 7.35–7.45)
PH SMN: 7.35 [PH] (ref 7.35–7.45)
PH SMN: 7.45 [PH] (ref 7.35–7.45)
PH UR STRIP: 7 [PH] (ref 5–8)
PHOSPHATE SERPL-MCNC: 3 MG/DL (ref 2.7–4.5)
PLATELET # BLD AUTO: 184 K/UL (ref 150–350)
PMV BLD AUTO: 10.4 FL (ref 9.2–12.9)
PO2 BLDA: 26 MMHG (ref 40–60)
PO2 BLDA: 27 MMHG (ref 40–60)
PO2 BLDA: 30 MMHG (ref 40–60)
PO2 BLDA: 42 MMHG (ref 80–100)
PO2 BLDA: 48 MMHG (ref 40–60)
POC BE: 19 MMOL/L
POC BE: 21 MMOL/L
POC BE: 22 MMOL/L
POC BE: 24 MMOL/L
POC BE: 25 MMOL/L
POC SATURATED O2: 37 % (ref 95–100)
POC SATURATED O2: 40 % (ref 95–100)
POC SATURATED O2: 47 % (ref 95–100)
POC SATURATED O2: 77 % (ref 95–100)
POC SATURATED O2: 78 % (ref 95–100)
POC TCO2: 45 MMOL/L (ref 23–27)
POC TCO2: 50 MMOL/L (ref 24–29)
POC TCO2: >50 MMOL/L (ref 24–29)
POCT GLUCOSE: 141 MG/DL (ref 70–110)
POTASSIUM SERPL-SCNC: 5.4 MMOL/L (ref 3.5–5.1)
PROT SERPL-MCNC: 6.3 G/DL (ref 6–8.4)
PROT UR QL STRIP: NEGATIVE
RBC # BLD AUTO: 3.73 M/UL (ref 4–5.4)
RBC #/AREA URNS AUTO: 1 /HPF (ref 0–4)
RESPIRATORY INFECTION PANEL SOURCE: NORMAL
RSV RNA NPH QL NAA+NON-PROBE: NOT DETECTED
RV+EV RNA NPH QL NAA+NON-PROBE: NOT DETECTED
SAMPLE: ABNORMAL
SITE: ABNORMAL
SODIUM SERPL-SCNC: 141 MMOL/L (ref 136–145)
SP GR UR STRIP: 1.02 (ref 1–1.03)
SP02: 85
SP02: 92
SP02: 94
SP02: 98
SPONT RATE: 14
SPONT RATE: 15
SPONT RATE: 5
SQUAMOUS #/AREA URNS AUTO: 5 /HPF
TSH SERPL DL<=0.005 MIU/L-ACNC: 0.8 UIU/ML (ref 0.4–4)
URN SPEC COLLECT METH UR: ABNORMAL
WBC # BLD AUTO: 5.26 K/UL (ref 3.9–12.7)
WBC #/AREA URNS AUTO: 2 /HPF (ref 0–5)

## 2019-11-08 PROCEDURE — 36415 COLL VENOUS BLD VENIPUNCTURE: CPT

## 2019-11-08 PROCEDURE — 87633 RESP VIRUS 12-25 TARGETS: CPT

## 2019-11-08 PROCEDURE — 87481 CANDIDA DNA AMP PROBE: CPT | Mod: 59

## 2019-11-08 PROCEDURE — 99900035 HC TECH TIME PER 15 MIN (STAT)

## 2019-11-08 PROCEDURE — 99223 1ST HOSP IP/OBS HIGH 75: CPT | Mod: ,,, | Performed by: HOSPITALIST

## 2019-11-08 PROCEDURE — 84100 ASSAY OF PHOSPHORUS: CPT

## 2019-11-08 PROCEDURE — 94761 N-INVAS EAR/PLS OXIMETRY MLT: CPT

## 2019-11-08 PROCEDURE — 87801 DETECT AGNT MULT DNA AMPLI: CPT

## 2019-11-08 PROCEDURE — 99223 PR INITIAL HOSPITAL CARE,LEVL III: ICD-10-PCS | Mod: ,,, | Performed by: HOSPITALIST

## 2019-11-08 PROCEDURE — 63600175 PHARM REV CODE 636 W HCPCS: Performed by: STUDENT IN AN ORGANIZED HEALTH CARE EDUCATION/TRAINING PROGRAM

## 2019-11-08 PROCEDURE — 25000242 PHARM REV CODE 250 ALT 637 W/ HCPCS: Performed by: STUDENT IN AN ORGANIZED HEALTH CARE EDUCATION/TRAINING PROGRAM

## 2019-11-08 PROCEDURE — 84443 ASSAY THYROID STIM HORMONE: CPT

## 2019-11-08 PROCEDURE — 27000221 HC OXYGEN, UP TO 24 HOURS

## 2019-11-08 PROCEDURE — 94640 AIRWAY INHALATION TREATMENT: CPT

## 2019-11-08 PROCEDURE — 11000001 HC ACUTE MED/SURG PRIVATE ROOM

## 2019-11-08 PROCEDURE — 92610 EVALUATE SWALLOWING FUNCTION: CPT

## 2019-11-08 PROCEDURE — 85379 FIBRIN DEGRADATION QUANT: CPT

## 2019-11-08 PROCEDURE — 25000003 PHARM REV CODE 250: Performed by: STUDENT IN AN ORGANIZED HEALTH CARE EDUCATION/TRAINING PROGRAM

## 2019-11-08 PROCEDURE — 87661 TRICHOMONAS VAGINALIS AMPLIF: CPT

## 2019-11-08 PROCEDURE — 82803 BLOOD GASES ANY COMBINATION: CPT

## 2019-11-08 PROCEDURE — 97162 PT EVAL MOD COMPLEX 30 MIN: CPT

## 2019-11-08 PROCEDURE — 80053 COMPREHEN METABOLIC PANEL: CPT

## 2019-11-08 PROCEDURE — 63600175 PHARM REV CODE 636 W HCPCS: Performed by: PHYSICIAN ASSISTANT

## 2019-11-08 PROCEDURE — 83036 HEMOGLOBIN GLYCOSYLATED A1C: CPT

## 2019-11-08 PROCEDURE — 36600 WITHDRAWAL OF ARTERIAL BLOOD: CPT

## 2019-11-08 PROCEDURE — 85025 COMPLETE CBC W/AUTO DIFF WBC: CPT

## 2019-11-08 PROCEDURE — 94660 CPAP INITIATION&MGMT: CPT

## 2019-11-08 PROCEDURE — 83735 ASSAY OF MAGNESIUM: CPT

## 2019-11-08 RX ORDER — HYDROCODONE BITARTRATE AND ACETAMINOPHEN 7.5; 325 MG/1; MG/1
TABLET ORAL
COMMUNITY

## 2019-11-08 RX ORDER — FLUTICASONE FUROATE AND VILANTEROL 100; 25 UG/1; UG/1
1 POWDER RESPIRATORY (INHALATION) DAILY
Status: DISCONTINUED | OUTPATIENT
Start: 2019-11-08 | End: 2019-11-12 | Stop reason: HOSPADM

## 2019-11-08 RX ORDER — LOSARTAN POTASSIUM 50 MG/1
100 TABLET ORAL DAILY
Status: DISCONTINUED | OUTPATIENT
Start: 2019-11-08 | End: 2019-11-08

## 2019-11-08 RX ORDER — AMOXICILLIN 250 MG
1 CAPSULE ORAL 2 TIMES DAILY
Status: DISCONTINUED | OUTPATIENT
Start: 2019-11-08 | End: 2019-11-12 | Stop reason: HOSPADM

## 2019-11-08 RX ORDER — FUROSEMIDE 40 MG/1
40 TABLET ORAL DAILY
Status: DISCONTINUED | OUTPATIENT
Start: 2019-11-08 | End: 2019-11-08

## 2019-11-08 RX ORDER — TIOTROPIUM BROMIDE 18 UG/1
1 CAPSULE ORAL; RESPIRATORY (INHALATION) DAILY
Status: DISCONTINUED | OUTPATIENT
Start: 2019-11-08 | End: 2019-11-12 | Stop reason: HOSPADM

## 2019-11-08 RX ORDER — AMOXICILLIN AND CLAVULANATE POTASSIUM 875; 125 MG/1; MG/1
1 TABLET, FILM COATED ORAL
Status: ON HOLD | COMMUNITY
End: 2019-11-11 | Stop reason: HOSPADM

## 2019-11-08 RX ORDER — AZITHROMYCIN 250 MG/1
500 TABLET, FILM COATED ORAL NIGHTLY
Status: COMPLETED | OUTPATIENT
Start: 2019-11-08 | End: 2019-11-11

## 2019-11-08 RX ORDER — PREDNISONE 10 MG/1
40 TABLET ORAL DAILY
Status: DISCONTINUED | OUTPATIENT
Start: 2019-11-08 | End: 2019-11-08

## 2019-11-08 RX ORDER — SODIUM CHLORIDE 0.9 % (FLUSH) 0.9 %
10 SYRINGE (ML) INJECTION
Status: DISCONTINUED | OUTPATIENT
Start: 2019-11-08 | End: 2019-11-12 | Stop reason: HOSPADM

## 2019-11-08 RX ORDER — RAMELTEON 8 MG/1
8 TABLET ORAL NIGHTLY PRN
Status: DISCONTINUED | OUTPATIENT
Start: 2019-11-08 | End: 2019-11-12 | Stop reason: HOSPADM

## 2019-11-08 RX ORDER — PREDNISONE 20 MG/1
40 TABLET ORAL DAILY
Status: COMPLETED | OUTPATIENT
Start: 2019-11-08 | End: 2019-11-12

## 2019-11-08 RX ORDER — HEPARIN SODIUM,PORCINE/PF 10 UNIT/ML
10 SYRINGE (ML) INTRAVENOUS ONCE
Status: DISCONTINUED | OUTPATIENT
Start: 2019-11-08 | End: 2019-11-08

## 2019-11-08 RX ORDER — ONDANSETRON 8 MG/1
8 TABLET, ORALLY DISINTEGRATING ORAL EVERY 8 HOURS PRN
Status: DISCONTINUED | OUTPATIENT
Start: 2019-11-08 | End: 2019-11-12 | Stop reason: HOSPADM

## 2019-11-08 RX ORDER — IPRATROPIUM BROMIDE AND ALBUTEROL SULFATE 2.5; .5 MG/3ML; MG/3ML
3 SOLUTION RESPIRATORY (INHALATION)
Status: DISCONTINUED | OUTPATIENT
Start: 2019-11-08 | End: 2019-11-10

## 2019-11-08 RX ORDER — ACETAMINOPHEN 325 MG/1
325 TABLET ORAL EVERY 6 HOURS PRN
Status: DISCONTINUED | OUTPATIENT
Start: 2019-11-08 | End: 2019-11-12 | Stop reason: HOSPADM

## 2019-11-08 RX ORDER — FUROSEMIDE 10 MG/ML
20 INJECTION INTRAMUSCULAR; INTRAVENOUS ONCE
Status: COMPLETED | OUTPATIENT
Start: 2019-11-08 | End: 2019-11-08

## 2019-11-08 RX ORDER — ALPRAZOLAM 0.5 MG/1
0.5 TABLET ORAL 2 TIMES DAILY PRN
COMMUNITY

## 2019-11-08 RX ORDER — CYPROHEPTADINE HYDROCHLORIDE 4 MG/1
4 TABLET ORAL 2 TIMES DAILY
COMMUNITY

## 2019-11-08 RX ORDER — GUAIFENESIN 600 MG/1
600 TABLET, EXTENDED RELEASE ORAL 2 TIMES DAILY
Status: DISCONTINUED | OUTPATIENT
Start: 2019-11-08 | End: 2019-11-12 | Stop reason: HOSPADM

## 2019-11-08 RX ORDER — ENOXAPARIN SODIUM 100 MG/ML
30 INJECTION SUBCUTANEOUS EVERY 24 HOURS
Status: DISCONTINUED | OUTPATIENT
Start: 2019-11-08 | End: 2019-11-12 | Stop reason: HOSPADM

## 2019-11-08 RX ORDER — BUTALBITAL, ACETAMINOPHEN, CAFFEINE AND CODEINE PHOSPHATE 300; 50; 40; 30 MG/1; MG/1; MG/1; MG/1
1 CAPSULE ORAL DAILY PRN
COMMUNITY

## 2019-11-08 RX ORDER — CARVEDILOL 3.12 MG/1
3.12 TABLET ORAL 2 TIMES DAILY
Status: DISCONTINUED | OUTPATIENT
Start: 2019-11-08 | End: 2019-11-08

## 2019-11-08 RX ADMIN — GUAIFENESIN 600 MG: 600 TABLET, EXTENDED RELEASE ORAL at 09:11

## 2019-11-08 RX ADMIN — RAMELTEON 8 MG: 8 TABLET ORAL at 09:11

## 2019-11-08 RX ADMIN — GUAIFENESIN 600 MG: 600 TABLET, EXTENDED RELEASE ORAL at 01:11

## 2019-11-08 RX ADMIN — FLUTICASONE FUROATE AND VILANTEROL TRIFENATATE 1 PUFF: 100; 25 POWDER RESPIRATORY (INHALATION) at 11:11

## 2019-11-08 RX ADMIN — PREDNISONE 40 MG: 10 TABLET ORAL at 09:11

## 2019-11-08 RX ADMIN — IPRATROPIUM BROMIDE AND ALBUTEROL SULFATE 3 ML: .5; 3 SOLUTION RESPIRATORY (INHALATION) at 09:11

## 2019-11-08 RX ADMIN — SODIUM CHLORIDE 1000 ML: 0.9 INJECTION, SOLUTION INTRAVENOUS at 12:11

## 2019-11-08 RX ADMIN — AZITHROMYCIN 500 MG: 250 TABLET, FILM COATED ORAL at 09:11

## 2019-11-08 RX ADMIN — IPRATROPIUM BROMIDE AND ALBUTEROL SULFATE 3 ML: .5; 3 SOLUTION RESPIRATORY (INHALATION) at 10:11

## 2019-11-08 RX ADMIN — ENOXAPARIN SODIUM 30 MG: 100 INJECTION SUBCUTANEOUS at 05:11

## 2019-11-08 RX ADMIN — ACETAMINOPHEN 325 MG: 325 TABLET ORAL at 11:11

## 2019-11-08 RX ADMIN — TIOTROPIUM BROMIDE 18 MCG: 18 CAPSULE ORAL; RESPIRATORY (INHALATION) at 11:11

## 2019-11-08 RX ADMIN — SENNOSIDES AND DOCUSATE SODIUM 1 TABLET: 8.6; 5 TABLET ORAL at 09:11

## 2019-11-08 RX ADMIN — FUROSEMIDE 20 MG: 10 INJECTION, SOLUTION INTRAMUSCULAR; INTRAVENOUS at 10:11

## 2019-11-08 RX ADMIN — IPRATROPIUM BROMIDE AND ALBUTEROL SULFATE 3 ML: .5; 3 SOLUTION RESPIRATORY (INHALATION) at 07:11

## 2019-11-08 NOTE — PT/OT/SLP EVAL
"Speech Language Pathology Evaluation  Bedside Swallow/ Discharge Summary    Patient Name:  Sonya Buckner   MRN:  [id number]   [id number] A    Admitting Diagnosis: Acute on chronic respiratory failure with hypoxia and hypercapnia    Recommendations:                 General Recommendations:  Follow-up not indicated  Diet recommendations:  Mechanical soft, Thin   · Mechanical soft solid diet recommend per pt's request, 2/2 report of gum pain during mastication of regular consistency solids.   · However she appears safe for regular consistency solid diet.   Aspiration Precautions: Standard aspiration precautions   General Precautions: Standard, respiratory, fall    History:     Past Medical History:   Diagnosis Date    Arthritis     Asthma     Bronchitis     C. difficile colitis 11/1/2014    CHF (congestive heart failure)     COPD (chronic obstructive pulmonary disease)     Depression     Hyperlipidemia     Hypertension      Past Surgical History:   Procedure Laterality Date    CHOLECYSTECTOMY      EYE SURGERY      OVARIAN CYST REMOVAL       Chest X-Rays: Per review of pt's medical chart, results of chest xray taken yesterday unremarkable for concern for aspiration-related illness.     Prior diet: Per pt, soft solids and thin liquids prior to initiation of this evaluation.     Subjective     "It hurts my gums." Pt re: mastication of regular consistency solid PO trials provided during this evaluation.     Pain/Comfort:  Pain Rating 1: 0/10  Pain Rating Post-Intervention 1: 0/10    Objective:     Oral Musculature Evaluation  Oral Musculature: WFL  Dentition: scattered dentition  Secretion Management: adequate  Mucosal Quality: good  Mandibular Strength and Mobility: WFL  Oral Labial Strength and Mobility: WFL  Lingual Strength and Mobility: WFL  Volitional Cough: WFL  Volitional Swallow: WFL  Voice Prior to PO Intake: Clear, dry     Bedside Swallow Eval:   Consistencies Assessed:  · Thin water- " multiple straw sips in isolation and as regular consistency solid liquid wash   · Regular consistency solid marion cracker- ~1/2 cracker via bite x3    Oral Phase:   · Although slow mastication of regular consistency solid appearing 2/2 scattered dentition as well as 2/2 pt's report of gum pain during mastication, remained functional      Pharyngeal Phase:   · Overt clinical signs aspiration unappreciated    Treatment:   Pt asleep upon entry. Easily awakened with gentle verbal stimulation. HOB raised. Education provided re: role of SLP, standard aspiration precautions, and SLP POC. Encouragement provided to request SLP re-consult should she experience swallowing changes. Pt verbalized understanding of education provided and agreement with SLP POC. White board updated. No further questions.     Assessment:     Sonya Buckner is a 64 y.o. female without additional acute SLP needs at this time. Please re-consult should changes occur.     Goals:   Multidisciplinary Problems     SLP Goals     Not on file          Multidisciplinary Problems (Resolved)        Problem: SLP Goal    Goal Priority Disciplines Outcome   SLP Goal   (Resolved)     SLP Met                 Plan:     · Plan of Care reviewed with:  patient   · SLP Follow-Up:  No       Discharge recommendations:  home     Time Tracking:     SLP Treatment Date:   11/08/19  Speech Start Time:  0858  Speech Stop Time:  0907     Speech Total Time (min):  9 min    Billable Minutes: Eval Swallow and Oral Function 9    JAXON Brown, CCC-SLP  [phone]  11/8/2019

## 2019-11-08 NOTE — SUBJECTIVE & OBJECTIVE
Past Medical History:   Diagnosis Date    Arthritis     Asthma     Bronchitis     CHF (congestive heart failure)     COPD (chronic obstructive pulmonary disease)     Depression     Hyperlipidemia     Hypertension        Past Surgical History:   Procedure Laterality Date    CHOLECYSTECTOMY      EYE SURGERY      OVARIAN CYST REMOVAL         Review of patient's allergies indicates:  No Known Allergies    No current facility-administered medications on file prior to encounter.      Current Outpatient Medications on File Prior to Encounter   Medication Sig    albuterol (PROVENTIL) 2.5 mg /3 mL (0.083 %) nebulizer solution Take 2.5 mg by nebulization every 6 (six) hours as needed for Wheezing. Rescue    albuterol (VENTOLIN HFA) 90 mcg/actuation inhaler Inhale 2 puffs into the lungs every 6 (six) hours as needed for Wheezing. Rescue    beclomethasone (QVAR) 80 mcg/actuation Aero Inhale 1 puff into the lungs 2 (two) times daily. Controller    escitalopram oxalate (LEXAPRO) 20 MG tablet Take 20 mg by mouth once daily.    furosemide (LASIX) 40 MG tablet Take 40 mg by mouth once daily.    losartan (COZAAR) 100 MG tablet Take 100 mg by mouth once daily.    carvedilol (COREG) 3.125 MG tablet Take 1 tablet (3.125 mg total) by mouth 2 (two) times daily.     Family History     None        Tobacco Use    Smoking status: Current Every Day Smoker     Types: Cigarettes   Substance and Sexual Activity    Alcohol use: Yes    Drug use: No    Sexual activity: Not on file     Review of Systems   Constitutional: Negative for chills, diaphoresis and fever.   HENT: Negative for rhinorrhea and sneezing.    Eyes: Negative for pain and visual disturbance.   Respiratory: Positive for cough, shortness of breath and wheezing. Negative for choking.    Cardiovascular: Negative for chest pain, palpitations and leg swelling.   Gastrointestinal: Positive for abdominal pain (RLQ). Negative for blood in stool, constipation, diarrhea,  nausea and vomiting.   Endocrine: Negative for cold intolerance and heat intolerance.   Genitourinary: Negative for dysuria, flank pain and hematuria.   Skin: Negative for rash and wound.   Neurological: Positive for headaches. Negative for dizziness, syncope and light-headedness.   Psychiatric/Behavioral: Positive for agitation, confusion and decreased concentration.     Objective:     Vital Signs (Most Recent):  Temp: 98 °F (36.7 °C) (11/08/19 0320)  Pulse: 87 (11/08/19 0320)  Resp: 15 (11/08/19 0320)  BP: 113/72 (11/08/19 0320)  SpO2: 95 % (11/08/19 0320) Vital Signs (24h Range):  Temp:  [97.6 °F (36.4 °C)-98 °F (36.7 °C)] 98 °F (36.7 °C)  Pulse:  [] 87  Resp:  [14-29] 15  SpO2:  [89 %-100 %] 95 %  BP: ()/(58-86) 113/72     Weight: 47.6 kg (105 lb)  Body mass index is 19.2 kg/m².    Physical Exam   Constitutional: She is oriented to person, place, and time. She appears well-developed and well-nourished.   HENT:   Head: Normocephalic and atraumatic.   On bipap     Eyes: Conjunctivae and EOM are normal. No scleral icterus.   Neck: Normal range of motion. No JVD present.   Cardiovascular: Normal rate, regular rhythm, normal heart sounds and intact distal pulses. Exam reveals no friction rub.   No murmur heard.  Pulmonary/Chest: No stridor. She is in respiratory distress. She has wheezes. She has no rales.   Patient on bipap currently   Abdominal: Soft. She exhibits no distension. There is tenderness (mild RLQ). There is no rebound and no guarding.   Musculoskeletal: Normal range of motion. She exhibits no edema.   Neurological: She is alert and oriented to person, place, and time.   Skin: Skin is warm and dry. She is not diaphoretic.   Psychiatric: She has a normal mood and affect. Her behavior is normal. Judgment and thought content normal.         CRANIAL NERVES     CN III, IV, VI   Extraocular motions are normal.        Significant Labs:   A1C: No results for input(s): HGBA1C in the last 4320  hours.  ABGs:   Recent Labs   Lab 11/07/19  2343   PH 7.344*   PCO2 88.3*   HCO3 48.1*   POCSATURATED 40*   BE 22     Bilirubin:   Recent Labs   Lab 10/20/19  1530 11/1955   BILITOT 0.2 0.2     Blood Culture:   Recent Labs   Lab 11/1957 11/07/19 1958   LABBLOO No Growth to date No Growth to date     BMP:   Recent Labs   Lab 11/1957   *      K 5.0   CL 90*   CO2 42*   BUN 18   CREATININE 0.9   CALCIUM 9.3     CBC:   Recent Labs   Lab 11/1957 11/07/19 2137   WBC 7.48  --    HGB 12.7  --    HCT 42.6 37     --      CMP:   Recent Labs   Lab 11/1957      K 5.0   CL 90*   CO2 42*   *   BUN 18   CREATININE 0.9   CALCIUM 9.3   PROT 6.4   ALBUMIN 3.2*   BILITOT 0.2   ALKPHOS 62   AST 18   ALT 15   ANIONGAP 9   EGFRNONAA >60.0     Cardiac Markers:   Recent Labs   Lab 11/1957   BNP 41     Coagulation: No results for input(s): PT, INR, APTT in the last 48 hours.  Lactic Acid:   Recent Labs   Lab 11/1957   LACTATE 1.1     Lipase: No results for input(s): LIPASE in the last 48 hours.  Lipid Panel: No results for input(s): CHOL, HDL, LDLCALC, TRIG, CHOLHDL in the last 48 hours.  Magnesium: No results for input(s): MG in the last 48 hours.  Pathology Results  (Last 10 years)    None        POCT Glucose:   Recent Labs   Lab 11/08/19  0401   POCTGLUCOSE 141*     Prealbumin: No results for input(s): PREALBUMIN in the last 48 hours.  Respiratory Culture: No results for input(s): GSRESP, RESPIRATORYC in the last 48 hours.  Troponin:   Recent Labs   Lab 11/1957   TROPONINI 0.010     TSH:   Recent Labs   Lab 10/20/19  1530   TSH 1.097     Urine Culture: No results for input(s): LABURIN in the last 48 hours.  Urine Studies:   Recent Labs   Lab 11/07/19  2351   COLORU Yellow   APPEARANCEUA Hazy*   PHUR 7.0   SPECGRAV 1.020   PROTEINUA Negative   GLUCUA 1+*   KETONESU Negative   BILIRUBINUA Negative   OCCULTUA Negative   NITRITE Negative   LEUKOCYTESUR  1+*   RBCUA 1   WBCUA 2   BACTERIA Rare   SQUAMEPITHEL 5   HYALINECASTS 14*     Recent Lab Results       11/08/19  0401   11/07/19  2351   11/07/19  2343   11/07/19  2137   11/07/19  2123        Albumin               Alkaline Phosphatase               Allens Test     N/A   N/A     ALT               Anion Gap               Appearance, UA   Hazy           AST               Bacteria, UA   Rare           Baso #               Basophil%               Bilirubin (UA)   Negative           BILIRUBIN TOTAL               Blood Culture, Routine               BNP               Site     Other   Other     BUN, Bld               Calcium               Chloride               CO2               Color, UA   Yellow           Creatinine               DelSys     CPAP/BiPAP   CPAP/BiPAP     Differential Method               eGFR if                eGFR if non                Eos #               Eosinophil%               EP     5   5     FiO2     28   28     Glucose               Glucose, UA   1+           Gran # (ANC)               Gran%               Hematocrit               Hemoglobin               Hyaline Casts, UA   14           Immature Grans (Abs)               Immature Granulocytes               IP     15   15     Ketones, UA   Negative           Lactate, Wolf               Leukocytes, UA   1+           Lymph #               Lymph%               MCH               MCHC               MCV               Microscopic Comment   SEE COMMENT  Comment:  Other formed elements not mentioned in the report are not   present in the microscopic examination.              Min Vol     9.3   6.6     Mode     BiPAP   BiPAP     Mono #               Mono%               MPV               NITRITE UA   Negative           nRBC               Occult Blood UA   Negative           pH, UA   7.0           Platelets               POC BE     22   24     POC BUN       21       POC Chloride       88       POC Creatinine       1.0       POC  Glucose       142       POC HCO3     48.1   49.9     POC Hematocrit       37       POC Ionized Calcium       1.23       POC Lactate               POC PCO2     88.3   98.5     POC PH     7.344   7.313     POC PO2     26   30     POC Potassium       5.1       POC SATURATED O2     40   47     POC Sodium       137       POC TCO2     >50   >50     POC TCO2 (MEASURED)       46       POCT Glucose 141             Potassium               PROTEIN TOTAL               Protein, UA   Negative  Comment:  Recommend a 24 hour urine protein or a urine   protein/creatinine ratio if globulin induced proteinuria is  clinically suspected.             Rate     12   12     RBC               RBC, UA   1           RDW               Sample     VENOUS DONTE VENOUS     Sodium               Sp02     92   94     Specific Gravity, UA   1.020           Specimen UA   Urine, Clean Catch           Spont Rate     14   5     Squam Epithel, UA   5           Troponin I               WBC, UA   2           WBC                                11/07/19 1958 11/1957 11/07/19 1935        Albumin   3.2       Alkaline Phosphatase   62       Allens Test     N/A     ALT   15       Anion Gap   9       Appearance, UA           AST   18       Bacteria, UA           Baso #   0.04       Basophil%   0.5       Bilirubin (UA)           BILIRUBIN TOTAL   0.2  Comment:  For infants and newborns, interpretation of results should be based  on gestational age, weight and in agreement with clinical  observations.  Premature Infant recommended reference ranges:  Up to 24 hours.............<8.0 mg/dL  Up to 48 hours............<12.0 mg/dL  3-5 days..................<15.0 mg/dL  6-29 days.................<15.0 mg/dL         Blood Culture, Routine No Growth to date[P] No Growth to date[P]       BNP   41  Comment:  Values of less than 100 pg/ml are consistent with non-CHF populations.       Site     Other     BUN, Bld   18       Calcium   9.3       Chloride   90       CO2    42  Comment:  *Critical value -   Results called to and read back by:PILAR ELENA RN         Color, UA           Creatinine   0.9       DelSys     CPAP/BiPAP     Differential Method   Automated       eGFR if    >60.0       eGFR if non    >60.0  Comment:  Calculation used to obtain the estimated glomerular filtration  rate (eGFR) is the CKD-EPI equation.          Eos #   0.1       Eosinophil%   1.6       EP     5     FiO2     40     Glucose   182       Glucose, UA           Gran # (ANC)   5.2       Gran%   69.3       Hematocrit   42.6       Hemoglobin   12.7       Hyaline Casts, UA           Immature Grans (Abs)   0.01  Comment:  Mild elevation in immature granulocytes is non specific and   can be seen in a variety of conditions including stress response,   acute inflammation, trauma and pregnancy. Correlation with other   laboratory and clinical findings is essential.         Immature Granulocytes   0.1       IP     10     Ketones, UA           Lactate, Wolf   1.1  Comment:  Falsely low lactic acid results can be found in samples   containing >=13.0 mg/dL total bilirubin and/or >=3.5 mg/dL   direct bilirubin.         Leukocytes, UA           Lymph #   1.5       Lymph%   20.5       MCH   31.6       MCHC   29.8       MCV   106       Microscopic Comment           Min Vol     8.6     Mode     BiPAP     Mono #   0.6       Mono%   8.0       MPV   11.0       NITRITE UA           nRBC   0       Occult Blood UA           pH, UA           Platelets   197       POC BE     25     POC BUN           POC Chloride           POC Creatinine           POC Glucose           POC HCO3     52.0     POC Hematocrit           POC Ionized Calcium           POC Lactate     0.88     POC PCO2     114.8     POC PH     7.264     POC PO2     27     POC Potassium           POC SATURATED O2     37     POC Sodium           POC TCO2     >50     POC TCO2 (MEASURED)           POCT Glucose           Potassium   5.0        PROTEIN TOTAL   6.4       Protein, UA           Rate     12     RBC   4.02       RBC, UA           RDW   12.4       Sample     VENOUS     Sodium   141       Sp02     98     Specific [place], UA           Specimen UA           Spont Rate     15     Squam Epithel, UA           Troponin I   0.010  Comment:  The reference interval for Troponin I represents the 99th percentile   cutoff   for our facility and is consistent with 3rd generation assay   performance.         WBC, UA           WBC   7.48           All pertinent labs within the past 24 hours have been reviewed.    Significant Imaging: I have reviewed all pertinent imaging results/findings within the past 24 hours.

## 2019-11-08 NOTE — ED NOTES
Pt reports to ED via EMS on CPAP due to pt SPO2 68% at home on 2-3 L oxygen. Family reports pt has been altered for the past 2 days, SOB, combative, pulling oxygen off, decreased appetite. Pt SPO2 99% on CPAP. Pt denies chest pain, fever, chills, HA.

## 2019-11-08 NOTE — PLAN OF CARE
Eval complete. Pt tolerated session well. Initiate OT POC.   Discharge Recommendation: Home Health  DME rec: TBD at next level of care.    Problem: Occupational Therapy Goal  Goal: Occupational Therapy Goal  Description  Goals to be met by: 11/6/2019     Patient will increase functional independence with ADLs by performing:    UE Dressing with [place].  LE Dressing with [place].  Grooming while standing at sink with Modified [place].  Toileting from toilet with Modified [place] for hygiene and clothing management.   Bathing from standing at sink with Modified [place].  Toilet transfer to toilet with Modified [place].     11/8/2019 1514 by Ajay Mahan, OT  Outcome: Ongoing, Progressing

## 2019-11-08 NOTE — ED NOTES
Pt and family members made aware that urine sample is needed. Pt and family members verbalized understanding.

## 2019-11-08 NOTE — H&P
[facility] Hospital Medicine  History & Physical    Patient Name: Sonya Buckner  MRN: [id number]  Admission Date: 11/7/2019  Attending Physician: Cam Brian MD   Primary Care Provider: Will Monaco MD    [facility] Medicine Team: [facility] 1 Federico Valera MD     Patient information was obtained from patient and ER records.     Subjective:     Principal Problem:Acute hypercapnic respiratory failure    Chief Complaint:   Chief Complaint   Patient presents with    Shortness of Breath     68% on 2LNC at home, COPD exacerbation, currently on CPAP        HPI: Ms Heredia is a 63 yo F with COPD, HFpEF (EF 60% 2018), asthma, and htn who presented to the ED on 11/8 in acute respiratory distress. Family is present with patient and states that she has been increasingly altered over the past 3 weeks but that it has been more pronounced today. They report that the patient is conversational at baseline but has been more combative and altered lately. She is on 2-3L NC at home but report that she desaturated to the 60s on 2L at home which prompted them to bring her to the hospital. Of note, patient presented to [place] one week ago with similar symptoms. Patient is also complaining of RLQ abdominal pain that has been present over the same timeline as well.    On examination in ED, patient was lethargic but alert and oriented. She was on CPAP satting 99% in ED and then on BiPaP. Patient denies any fever, chills, dyspnea, BV, dysuria, hematuria. Patient is a current and long time smoker, down to 1PPW. Family denies any drinking or drug use other than remote marijuana use.        Past Medical History:   Diagnosis Date    Arthritis     Asthma     Bronchitis     CHF (congestive heart failure)     COPD (chronic obstructive pulmonary disease)     Depression     Hyperlipidemia     Hypertension        Past Surgical History:   Procedure Laterality Date    CHOLECYSTECTOMY      EYE SURGERY       OVARIAN CYST REMOVAL         Review of patient's allergies indicates:  No Known Allergies    No current facility-administered medications on file prior to encounter.      Current Outpatient Medications on File Prior to Encounter   Medication Sig    albuterol (PROVENTIL) 2.5 mg /3 mL (0.083 %) nebulizer solution Take 2.5 mg by nebulization every 6 (six) hours as needed for Wheezing. Rescue    albuterol (VENTOLIN HFA) 90 mcg/actuation inhaler Inhale 2 puffs into the lungs every 6 (six) hours as needed for Wheezing. Rescue    beclomethasone (QVAR) 80 mcg/actuation Aero Inhale 1 puff into the lungs 2 (two) times daily. Controller    escitalopram oxalate (LEXAPRO) 20 MG tablet Take 20 mg by mouth once daily.    furosemide (LASIX) 40 MG tablet Take 40 mg by mouth once daily.    losartan (COZAAR) 100 MG tablet Take 100 mg by mouth once daily.    carvedilol (COREG) 3.125 MG tablet Take 1 tablet (3.125 mg total) by mouth 2 (two) times daily.     Family History     None        Tobacco Use    Smoking status: Current Every Day Smoker     Types: Cigarettes   Substance and Sexual Activity    Alcohol use: Yes    Drug use: No    Sexual activity: Not on file     Review of Systems   Constitutional: Negative for chills, diaphoresis and fever.   HENT: Negative for rhinorrhea and sneezing.    Eyes: Negative for pain and visual disturbance.   Respiratory: Positive for cough, shortness of breath and wheezing. Negative for choking.    Cardiovascular: Negative for chest pain, palpitations and leg swelling.   Gastrointestinal: Positive for abdominal pain (RLQ). Negative for blood in stool, constipation, diarrhea, nausea and vomiting.   Endocrine: Negative for cold intolerance and heat intolerance.   Genitourinary: Negative for dysuria, flank pain and hematuria.   Skin: Negative for rash and wound.   Neurological: Positive for headaches. Negative for dizziness, syncope and light-headedness.   Psychiatric/Behavioral: Positive for  agitation, confusion and decreased concentration.     Objective:     Vital Signs (Most Recent):  Temp: 98 °F (36.7 °C) (11/08/19 0320)  Pulse: 87 (11/08/19 0320)  Resp: 15 (11/08/19 0320)  BP: 113/72 (11/08/19 0320)  SpO2: 95 % (11/08/19 0320) Vital Signs (24h Range):  Temp:  [97.6 °F (36.4 °C)-98 °F (36.7 °C)] 98 °F (36.7 °C)  Pulse:  [] 87  Resp:  [14-29] 15  SpO2:  [89 %-100 %] 95 %  BP: ()/(58-86) 113/72     Weight: 47.6 kg (105 lb)  Body mass index is 19.2 kg/m².    Physical Exam   Constitutional: She is oriented to person, place, and time. She appears well-developed and well-nourished.   HENT:   Head: Normocephalic and atraumatic.   On bipap     Eyes: Conjunctivae and EOM are normal. No scleral icterus.   Neck: Normal range of motion. No JVD present.   Cardiovascular: Normal rate, regular rhythm, normal heart sounds and intact distal pulses. Exam reveals no friction rub.   No murmur heard.  Pulmonary/Chest: No stridor. She is in respiratory distress. She has wheezes. She has no rales.   Patient on bipap currently   Abdominal: Soft. She exhibits no distension. There is tenderness (mild RLQ). There is no rebound and no guarding.   Musculoskeletal: Normal range of motion. She exhibits no edema.   Neurological: She is alert and oriented to person, place, and time.   Skin: Skin is warm and dry. She is not diaphoretic.   Psychiatric: She has a normal mood and affect. Her behavior is normal. Judgment and thought content normal.         CRANIAL NERVES     CN III, IV, VI   Extraocular motions are normal.        Significant Labs:   A1C: No results for input(s): HGBA1C in the last 4320 hours.  ABGs:   Recent Labs   Lab 11/07/19  2343   PH 7.344*   PCO2 88.3*   HCO3 48.1*   POCSATURATED 40*   BE 22     Bilirubin:   Recent Labs   Lab 10/20/19  1530 11/1957   BILITOT 0.2 0.2     Blood Culture:   Recent Labs   Lab 11/1957 11/07/19 1958   LABBLOO No Growth to date No Growth to date     BMP:   Recent  Labs   Lab 11/1957   *      K 5.0   CL 90*   CO2 42*   BUN 18   CREATININE 0.9   CALCIUM 9.3     CBC:   Recent Labs   Lab 11/1957 11/07/19 2137   WBC 7.48  --    HGB 12.7  --    HCT 42.6 37     --      CMP:   Recent Labs   Lab 11/1957      K 5.0   CL 90*   CO2 42*   *   BUN 18   CREATININE 0.9   CALCIUM 9.3   PROT 6.4   ALBUMIN 3.2*   BILITOT 0.2   ALKPHOS 62   AST 18   ALT 15   ANIONGAP 9   EGFRNONAA >60.0     Cardiac Markers:   Recent Labs   Lab 11/1957   BNP 41     Coagulation: No results for input(s): PT, INR, APTT in the last 48 hours.  Lactic Acid:   Recent Labs   Lab 11/1957   LACTATE 1.1     Lipase: No results for input(s): LIPASE in the last 48 hours.  Lipid Panel: No results for input(s): CHOL, HDL, LDLCALC, TRIG, CHOLHDL in the last 48 hours.  Magnesium: No results for input(s): MG in the last 48 hours.  Pathology Results  (Last 10 years)    None        POCT Glucose:   Recent Labs   Lab 11/08/19  0401   POCTGLUCOSE 141*     Prealbumin: No results for input(s): PREALBUMIN in the last 48 hours.  Respiratory Culture: No results for input(s): GSRESP, RESPIRATORYC in the last 48 hours.  Troponin:   Recent Labs   Lab 11/1957   TROPONINI 0.010     TSH:   Recent Labs   Lab 10/20/19  1530   TSH 1.097     Urine Culture: No results for input(s): LABURIN in the last 48 hours.  Urine Studies:   Recent Labs   Lab 11/07/19 2351   COLORU Yellow   APPEARANCEUA Hazy*   PHUR 7.0   SPECGRAV 1.020   PROTEINUA Negative   GLUCUA 1+*   KETONESU Negative   BILIRUBINUA Negative   OCCULTUA Negative   NITRITE Negative   LEUKOCYTESUR 1+*   RBCUA 1   WBCUA 2   BACTERIA Rare   SQUAMEPITHEL 5   HYALINECASTS 14*     Recent Lab Results       11/08/19  0401   11/07/19  2351   11/07/19  2343   11/07/19 2137 11/07/19 2123        Albumin               Alkaline Phosphatase               Allens Test     N/A   N/A     ALT               Anion Gap                Appearance, UA   Hazy           AST               Bacteria, UA   Rare           Baso #               Basophil%               Bilirubin (UA)   Negative           BILIRUBIN TOTAL               Blood Culture, Routine               BNP               Site     Other   Other     BUN, Bld               Calcium               Chloride               CO2               Color, UA   Yellow           Creatinine               DelSys     CPAP/BiPAP   CPAP/BiPAP     Differential Method               eGFR if                eGFR if non                Eos #               Eosinophil%               EP     5   5     FiO2     28   28     Glucose               Glucose, UA   1+           Gran # (ANC)               Gran%               Hematocrit               Hemoglobin               Hyaline Casts, UA   14           Immature Grans (Abs)               Immature Granulocytes               IP     15   15     Ketones, UA   Negative           Lactate, Wolf               Leukocytes, UA   1+           Lymph #               Lymph%               MCH               MCHC               MCV               Microscopic Comment   SEE COMMENT  Comment:  Other formed elements not mentioned in the report are not   present in the microscopic examination.              Min Vol     9.3   6.6     Mode     BiPAP   BiPAP     Mono #               Mono%               MPV               NITRITE UA   Negative           nRBC               Occult Blood UA   Negative           pH, UA   7.0           Platelets               POC BE     22   24     POC BUN       21       POC Chloride       88       POC Creatinine       1.0       POC Glucose       142       POC HCO3     48.1   49.9     POC Hematocrit       37       POC Ionized Calcium       1.23       POC Lactate               POC PCO2     88.3   98.5     POC PH     7.344   7.313     POC PO2     26   30     POC Potassium       5.1       POC SATURATED O2     40   47     POC Sodium       137       POC TCO2      >50   >50     POC TCO2 (MEASURED)       46       POCT Glucose 141             Potassium               PROTEIN TOTAL               Protein, UA   Negative  Comment:  Recommend a 24 hour urine protein or a urine   protein/creatinine ratio if globulin induced proteinuria is  clinically suspected.             Rate     12   12     RBC               RBC, UA   1           RDW               Sample     VENOUS DONTE VENOUS     Sodium               Sp02     92   94     Specific Gravity, UA   1.020           Specimen UA   Urine, Clean Catch           Spont Rate     14   5     Squam Epithel, UA   5           Troponin I               WBC, UA   2           WBC                                11/07/19 1958 11/1957 11/07/19 1935        Albumin   3.2       Alkaline Phosphatase   62       Allens Test     N/A     ALT   15       Anion Gap   9       Appearance, UA           AST   18       Bacteria, UA           Baso #   0.04       Basophil%   0.5       Bilirubin (UA)           BILIRUBIN TOTAL   0.2  Comment:  For infants and newborns, interpretation of results should be based  on gestational age, weight and in agreement with clinical  observations.  Premature Infant recommended reference ranges:  Up to 24 hours.............<8.0 mg/dL  Up to 48 hours............<12.0 mg/dL  3-5 days..................<15.0 mg/dL  6-29 days.................<15.0 mg/dL         Blood Culture, Routine No Growth to date[P] No Growth to date[P]       BNP   41  Comment:  Values of less than 100 pg/ml are consistent with non-CHF populations.       Site     Other     BUN, Bld   18       Calcium   9.3       Chloride   90       CO2   42  Comment:  *Critical value -   Results called to and read back by:PILAR ELENA RN         Color, UA           Creatinine   0.9       DelSys     CPAP/BiPAP     Differential Method   Automated       eGFR if    >60.0       eGFR if non    >60.0  Comment:  Calculation used to obtain the  estimated glomerular filtration  rate (eGFR) is the CKD-EPI equation.          Eos #   0.1       Eosinophil%   1.6       EP     5     FiO2     40     Glucose   182       Glucose, UA           Gran # (ANC)   5.2       Gran%   69.3       Hematocrit   42.6       Hemoglobin   12.7       Hyaline Casts, UA           Immature Grans (Abs)   0.01  Comment:  Mild elevation in immature granulocytes is non specific and   can be seen in a variety of conditions including stress response,   acute inflammation, trauma and pregnancy. Correlation with other   laboratory and clinical findings is essential.         Immature Granulocytes   0.1       IP     10     Ketones, UA           Lactate, Wolf   1.1  Comment:  Falsely low lactic acid results can be found in samples   containing >=13.0 mg/dL total bilirubin and/or >=3.5 mg/dL   direct bilirubin.         Leukocytes, UA           Lymph #   1.5       Lymph%   20.5       MCH   31.6       MCHC   29.8       MCV   106       Microscopic Comment           Min Vol     8.6     Mode     BiPAP     Mono #   0.6       Mono%   8.0       MPV   11.0       NITRITE UA           nRBC   0       Occult Blood UA           pH, UA           Platelets   197       POC BE     25     POC BUN           POC Chloride           POC Creatinine           POC Glucose           POC HCO3     52.0     POC Hematocrit           POC Ionized Calcium           POC Lactate     0.88     POC PCO2     114.8     POC PH     7.264     POC PO2     27     POC Potassium           POC SATURATED O2     37     POC Sodium           POC TCO2     >50     POC TCO2 (MEASURED)           POCT Glucose           Potassium   5.0       PROTEIN TOTAL   6.4       Protein, UA           Rate     12     RBC   4.02       RBC, UA           RDW   12.4       Sample     VENOUS     Sodium   141       Sp02     98     Specific [place], UA           Specimen UA           Spont Rate     15     Squam Epithel, UA           Troponin I   0.010  Comment:  The reference  interval for Troponin I represents the 99th percentile   cutoff   for our facility and is consistent with 3rd generation assay   performance.         WBC, UA           WBC   7.48           All pertinent labs within the past 24 hours have been reviewed.    Significant Imaging: I have reviewed all pertinent imaging results/findings within the past 24 hours.    Assessment/Plan:     (HFpEF) heart failure with preserved ejection fraction  Last EF 60% in 2018    Currently holding home bblocker and lasix      HTN (hypertension)  Home meds: carvedilol 3.125 bid, lasix 40 po, losartan 100    Plan:  - hold all home medications      Acute hypercapnic respiratory failure  Patient presenting with hypercapnic respiratory failure likely 2/2 to COPD exacerbation.    CXR demonstrated Pulmonary emphysema without detrimental change or radiographic acute intrathoracic process seen.  Specifically, no focal consolidation.    VBG initially pH 7.264 with CO2 114 and HCO3 52. Currently 7.344/88/48 on BiPAP.    Plan:  - Patient currently on BiPaP. Plan to wean to NC 3 if tolerated with O2 goals of 88-92. Can put back to BiPaP 12/5 if needed  - Continue azithro x5days; first dose given in ED  - Continue steroids x5 days; patient given solumedrol IV en route; continue with po prednisone 40  - f/u resp cultures  - f/u dDimer   - NPO with SLP swallow       COPD with acute exacerbation  See acute hypercapnic resp failure        VTE Risk Mitigation (From admission, onward)         Ordered     Place SANTOS hose  Until discontinued      11/08/19 0300     Place sequential compression device  Until discontinued      11/08/19 0300     IP VTE HIGH RISK PATIENT  Once      11/08/19 0300                   Federico Valera MD  Department of Hospital Medicine   [facility]

## 2019-11-08 NOTE — ED TRIAGE NOTES
Shortness of Breath (68% on 2LNC at home, COPD exacerbation, currently on CPAP. Pt has reported to have AMS and pulling off her O2.  Pt has been hallucinating x 2days.

## 2019-11-08 NOTE — PLAN OF CARE
Clinical evaluation of swallow complete. Recommend mechanical soft solid diet, per pt's request, and thin liquids. However she appears safe for regular consistency solids. No additional acute SLP needs at this time. Please re-consult should changes occur.     JAXON Brown, CCC-SLP  [phone]  11/8/2019

## 2019-11-08 NOTE — PT/OT/SLP EVAL
Occupational Therapy   Evaluation    Name: Sonya Buckner  MRN: [id number]  Admitting Diagnosis:  Acute on chronic respiratory failure with hypoxia and hypercapnia      Recommendations:     Discharge Recommendations: home with home health  Discharge Equipment Recommendations:  none  Barriers to discharge:  None    Assessment:     Sonya Buckner is a 64 y.o. female with a medical diagnosis of Acute on chronic respiratory failure with hypoxia and hypercapnia.  She presents with poor endurance, increased pain, poor mobility that limits her functional mobility. Performance deficits affecting function: weakness, impaired endurance, impaired self care skills, impaired functional mobilty, decreased upper extremity function, decreased lower extremity function, decreased safety awareness, impaired cardiopulmonary response to activity.      Rehab Prognosis: Fair; patient would benefit from acute skilled OT services to address these deficits and reach maximum level of function.       Plan:     Patient to be seen 3 x/week to address the above listed problems via self-care/home management, therapeutic activities, therapeutic exercises  · Plan of Care Expires: 11/08/19  · Plan of Care Reviewed with: patient    Subjective     Chief Complaint: fatigue  Patient/Family Comments/goals: Medical management and discharge.     Occupational Profile:  Living Environment: Pt lives c daughter in [facility] w/ ramp  Previous level of function: patients level of function was requiring assistance c ADLs and mod I c mobility  Roles and Routines: Retired  Equipment Used at Home:    CPAP, oxygen  Assistance upon Discharge: Yes from family.    Pain/Comfort:  · Pain Rating 1: 0/10  · Pain Rating Post-Intervention 1: 0/10    Patients cultural, spiritual, Quaker conflicts given the current situation: no    Objective:     Communicated with: RN prior to session.  Patient found HOB elevated with CPAP, oxygen, peripheral IV upon OT entry  to room.    General Precautions: Standard, fall, respiratory   Orthopedic Precautions:N/A   Braces: N/A     Occupational Performance:    Bed Mobility:    · Patient completed Rolling/Turning to Left with  supervision  · Patient completed Rolling/Turning to Right with supervision  · Patient completed Scooting/Bridging with supervision  · Patient completed Supine to Sit with supervision  · Patient completed Sit to Supine with supervision    Functional Mobility/Transfers:  · Patient completed Sit <> Stand Transfer with contact guard assistance  with  hand-held assist   · Patient completed Toilet Transfer Step Transfer technique with contact guard assistance with  hand-held assist  · Functional Mobility: Able to take steps to commode w/ CGA no ad.    Activities of Daily Living:  · Lower Body Dressing: contact guard assistance seated at EOB.  · Toileting: supervision seated EOB    Cognitive/Visual Perceptual:  Completely cognitively intact adult w/ no glasses worn or present during eval.       Physical Exam:  Balance:    -       seated and standing at EOB CGA  Dominant hand:    -       RH  Upper Extremity Range of Motion:     -       Right Upper Extremity: WFL  -       Left Upper Extremity: WFL  Upper Extremity Strength:    -       Right Upper Extremity: WFL  -       Left Upper Extremity: WFL   Strength:    -       Right Upper Extremity: WFL  -       Left Upper Extremity: WFL  Fine Motor Coordination:    -       Intact  Gross motor coordination:   WFL    AMPAC 6 Click ADL:  AMPAC Total Score: 20    Treatment & Education:  -Pt edu on OT role/POC  -Importance of OOB activity with staff assistance  -Safety during functional t/f and mobility  - White board updated  - Multiple self care tasks/functional mobility completed-- assistance level noted above  - All questions/concerns answered within OT scope of practice    Education:    Patient left HOB elevated with all lines intact, call button in reach and RN  notified    GOALS:   Multidisciplinary Problems     Occupational Therapy Goals     Not on file          Multidisciplinary Problems (Resolved)        Problem: Occupational Therapy Goal    Goal Priority Disciplines Outcome Interventions   Occupational Therapy Goal   (Resolved)     OT, PT/OT Met    Description:  Goals to be met by: 11/6/2019     Patient will increase functional independence with ADLs by performing:    UE Dressing with [place].  LE Dressing with [place].  Grooming while standing at sink with Modified [place].  Toileting from toilet with Modified [place] for hygiene and clothing management.   Bathing from standing at sink with Modified [place].  Toilet transfer to toilet with Modified [place].                      History:     Past Medical History:   Diagnosis Date    Arthritis     Asthma     Bronchitis     C. difficile colitis 11/1/2014    CHF (congestive heart failure)     COPD (chronic obstructive pulmonary disease)     Depression     Hyperlipidemia     Hypertension        Past Surgical History:   Procedure Laterality Date    CHOLECYSTECTOMY      EYE SURGERY      OVARIAN CYST REMOVAL         Time Tracking:     OT Date of Treatment: 11/08/19  OT Start Time: 1124  OT Stop Time: 1133  OT Total Time (min): 9 min    Billable Minutes:Evaluation 9 mins    Ajay Mahan OT  11/8/2019

## 2019-11-08 NOTE — ED NOTES
Report received by ANURAG Dorantes. Pt reports to ED c/o Shortness of Breath (68% on 2LNC at home, COPD exacerbation, currently on CPAP.) Pt has reported to have AMS and pulling off her O2.  Pt has been hallucinating x 2days.

## 2019-11-08 NOTE — ED NOTES
War room contacted about status of continuous pulse oximetry. War room confirmed that there were no continuous pulse oximetry and pt was placed on waiting list.

## 2019-11-08 NOTE — ASSESSMENT & PLAN NOTE
Home meds: carvedilol 3.125 bid, lasix 40 po, losartan 100    Plan:  - hold all home medications

## 2019-11-08 NOTE — ED PROVIDER NOTES
Encounter Date: 11/7/2019       History     Chief Complaint   Patient presents with    Shortness of Breath     68% on 2LNC at home, COPD exacerbation, currently on CPAP     64-year-old female to the ER via EMS for evaluation of respiratory distress.  Past medical history significant for COPD, CHF EF 65% diastolic dysfunction.  Patient is a chronic smoker and uses 2-3 L oxygen nasal cannula at home.  Family present in the ED.  There they report that the patient has been altered over the past 2 days and more short of breath.  She has been combative, pulling off her oxygen and fighting with family.  EMS reports O2 sat 68% on 2 L nasal cannula at home on arrival, patient was not moving much air.  She was awake lethargic but alert.  Upon arrival to the ER she is on CPAP satting 99%.  She does appear to be fatigued, tachypneic, and working hard to breathe, somewhat lethargic.  She does time as she has been taking off her oxygen, she is answering my questions and following commands but difficult to keep aroused.  She was given Solu-Medrol and duo nebs in route.         Review of patient's allergies indicates:  No Known Allergies  Past Medical History:   Diagnosis Date    Arthritis     Asthma     Bronchitis     CHF (congestive heart failure)     COPD (chronic obstructive pulmonary disease)     Depression     Hyperlipidemia     Hypertension      Past Surgical History:   Procedure Laterality Date    CHOLECYSTECTOMY      EYE SURGERY      OVARIAN CYST REMOVAL       No family history on file.  Social History     Tobacco Use    Smoking status: Current Every Day Smoker     Types: Cigarettes   Substance Use Topics    Alcohol use: Yes    Drug use: No     Review of Systems   Constitutional: Negative for fever.   HENT: Negative for sore throat.    Respiratory: Positive for shortness of breath.    Cardiovascular: Negative for chest pain.   Gastrointestinal: Negative for nausea.   Genitourinary: Negative for dysuria.    Musculoskeletal: Negative for back pain.   Skin: Negative for rash.   Neurological: Negative for weakness.   Hematological: Does not bruise/bleed easily.       Physical Exam     Initial Vitals [11/07/19 1915]   BP Pulse Resp Temp SpO2   120/86 (!) 118 (!) 28 97.6 °F (36.4 °C) 96 %      MAP       --         Physical Exam    Constitutional: Vital signs are normal. She appears well-developed and well-nourished. She is not diaphoretic. She appears distressed.   HENT:   Head: Normocephalic and atraumatic.   Right Ear: External ear normal.   Left Ear: External ear normal.   Eyes: Conjunctivae are normal.   Neck: Neck supple.   Cardiovascular: Normal rate and regular rhythm. Exam reveals no gallop and no friction rub.    No murmur heard.  No lower extremity edema  No JVD   Pulmonary/Chest: She is in respiratory distress. She exhibits no tenderness.   Minimal air movement   Abdominal: Soft. Normal appearance and bowel sounds are normal. She exhibits no distension and no mass. There is no tenderness. There is no rebound and no guarding.   Musculoskeletal: Normal range of motion.   Neurological: She is alert and oriented to person, place, and time.   Lethargic  Answers questions and follows commands   Skin: Skin is warm and intact.   Psychiatric: She has a normal mood and affect. Her speech is normal and behavior is normal. Cognition and memory are normal.         ED Course   Procedures  Labs Reviewed   CBC W/ AUTO DIFFERENTIAL - Abnormal; Notable for the following components:       Result Value    Mean Corpuscular Volume 106 (*)     Mean Corpuscular Hemoglobin 31.6 (*)     Mean Corpuscular Hemoglobin Conc 29.8 (*)     All other components within normal limits   CULTURE, BLOOD   CULTURE, BLOOD   INFLUENZA A & B BY MOLECULAR   COMPREHENSIVE METABOLIC PANEL   LACTIC ACID, PLASMA   URINALYSIS, REFLEX TO URINE CULTURE   B-TYPE NATRIURETIC PEPTIDE   TROPONIN I     EKG Readings: (Independently Interpreted)   Initial Reading: No  STEMI.   NSR, rate 100       Imaging Results    None          Medical Decision Making:   History:   Old Medical Records: I decided to obtain old medical records.  Old Records Summarized: other records.  Initial Assessment:   64-year-old female respiratory distress, short breath on arrival  Differential Diagnosis:   COPD, CHF, pneumonia,   Clinical Tests:   Lab Tests: Ordered and Reviewed  Radiological Study: Ordered and Reviewed  Medical Tests: Ordered and Reviewed  ED Management:  Initial blood gas, pH 7.2 CO2 114  After 2 hours on bipap at 40% FIO2 pH 7.35 CO2 98  BiPap dropped to 28% @ 1030pm  Nothing acute on labs, ekg or chest xray,   Pt given albuterol, solumedrol, magnessium and ABX  Discussed with hospital medicine will admit  Other:   I discussed test(s) with the performing physician.  I have discussed this case with another health care provider.                                 Clinical Impression:       ICD-10-CM ICD-9-CM   1. COPD exacerbation J44.1 491.21   2. SOB (shortness of breath) R06.02 786.05         Disposition:   Disposition: Discharged  Condition: Stable                     Merrill Saucedo PA-C  11/07/19 2331

## 2019-11-08 NOTE — PLAN OF CARE
Problem: Physical Therapy Goal  Goal: Physical Therapy Goal  Description  Goals to be met by: 2019     Patient will increase functional independence with mobility by performin. Supine to sit with Modified [place]  2. Sit to supine with Modified [place]  3. Sit to stand transfer with Supervision  4. Gait  x 75 feet with Stand-by Assistance using LRAD     Outcome: Ongoing, Progressing     Mckinley López PT,DPT  2019

## 2019-11-08 NOTE — ASSESSMENT & PLAN NOTE
Patient presenting with hypercapnic respiratory failure likely 2/2 to COPD exacerbation.    CXR demonstrated Pulmonary emphysema without detrimental change or radiographic acute intrathoracic process seen.  Specifically, no focal consolidation.    VBG initially pH 7.264 with CO2 114 and HCO3 52. Currently 7.344/88/48 on BiPAP.    Plan:  - Patient currently on BiPaP. Plan to wean to NC 3 if tolerated with O2 goals of 88-92. Can put back to BiPaP 12/5 if needed  - Continue azithro x5days; first dose given in ED  - Continue steroids x5 days; patient given solumedrol IV en route; continue with po prednisone 40  - f/u resp cultures  - f/u dDimer   - NPO with SLP swallow

## 2019-11-08 NOTE — HPI
Ms Heredia is a 63 yo F with COPD, HFpEF (EF 60% 2018), asthma, and htn who presented to the ED on 11/8 in acute respiratory distress. Family is present with patient and states that she has been increasingly altered over the past 3 weeks but that it has been more pronounced today. They report that the patient is conversational at baseline but has been more combative and altered lately. She is on 2-3L NC at home but report that she desaturated to the 60s on 2L at home which prompted them to bring her to the hospital. Of note, patient presented to [facility] one week ago with similar symptoms. Patient is also complaining of RLQ abdominal pain that has been present over the same timeline as well.    On examination in ED, patient was lethargic but alert and oriented. She was on CPAP satting 99% in ED and then on BiPaP. Patient denies any fever, chills, dyspnea, BV, dysuria, hematuria. Patient is a current and long time smoker, down to 1PPW. Family denies any drinking or drug use other than remote marijuana use.

## 2019-11-08 NOTE — PLAN OF CARE
11/08/19 1408   Discharge Assessment   Assessment Type Discharge Planning Assessment   Confirmed/corrected address and phone number on facesheet? Yes   Assessment information obtained from? Patient;Caregiver   Expected Length of Stay (days) 4   Communicated expected length of stay with patient/caregiver yes   Prior to hospitilization cognitive status: Alert/Oriented   Prior to hospitalization functional status: Independent   Current cognitive status: Alert/Oriented   Current Functional Status: Independent   Lives With child(ry), adult   Able to Return to Prior Arrangements other (see comments)   Is patient able to care for self after discharge? Unable to determine at this time (comments)   Patient's perception of discharge disposition admitted as an inpatient   Readmission Within the Last 30 Days no previous admission in last 30 days   Patient currently being followed by outpatient case management? No   Patient currently receives any other outside agency services? No   Equipment Currently Used at Home none   Do you have any problems affording any of your prescribed medications? No   Is the patient taking medications as prescribed? yes   Does the patient have transportation home? Yes   Does the patient receive services at the [facility]? No   Discharge Plan A Home   Discharge Plan B Home with family   DME Needed Upon Discharge  none   Patient/Family in Agreement with Plan yes   Pt verified all contact information on facesheet.  Pt states that she lives with her daughter, has never had HH or DME.  Pt states that she does not drive and that her daughter provides transportation.  Pt would like to return home upon DC.  SW will F/U as needed.        Britney Razo, ELIS  [id number]

## 2019-11-08 NOTE — CARE UPDATE
[id number] patient transported from ED 12 to bed 1123A via Bipap with oxygen attached ( 2 full tanks). Ambu bag and mask at [facility]. Patient tolerated well no complications noted. Report and care of patient turned over to RT Patrice.

## 2019-11-08 NOTE — PT/OT/SLP EVAL
Physical Therapy Evaluation    Patient Name:  Sonya Buckner   MRN:  [id number]    Recommendations:     Discharge Recommendations:  home with home health   Discharge Equipment Recommendations: none   Barriers to discharge: Inaccessible home and Decreased caregiver support    Assessment:     Sonya Buckner is a 64 y.o. female admitted with a medical diagnosis of Acute on chronic respiratory failure with hypoxia and hypercapnia.  She presents with the following impairments/functional limitations:  weakness, impaired self care skills, impaired endurance, impaired functional mobilty, gait instability, impaired cardiopulmonary response to activity.  Tolerated session c c/o fatigue.  Performed mobility c S-CGA.  Pt able to amb short distance along EOB c CGA - demo increased lateral trunk lean and unsteadiness.  Pt safe to transfer to bedside chair c assistance of 1x person (stand pivot).  Activity limited on this date d/t pt's c/o fatigue and being cold and requesting to return to bed.  Pt would benefit from continued skilled acute PT 3x/wk to improve functional mobility.  Recommending pt receive PT services in  setting following d/c from hospital once medically cleared.      Rehab Prognosis: Good; patient would benefit from acute skilled PT services to address these deficits and reach maximum level of function.    Recent Surgery: * No surgery found *      Plan:     During this hospitalization, patient to be seen 3 x/week to address the identified rehab impairments via gait training, therapeutic activities, therapeutic exercises, neuromuscular re-education and progress toward the following goals:    · Plan of Care Expires:  12/03/19    Subjective     Chief Complaint: fatigue  Patient/Family Comments/goals: to return home  Pain/Comfort:  · Pain Rating 1: 0/10    Patients cultural, spiritual, Yazidism conflicts given the current situation: no    Living Environment:  Pt lives c daughter in [facility] c ramp.   PTA reports no falls.  Prior to admission, patients level of function was requiring assistance c ADLs and mod I c mobility.  Equipment used at home: CPAP, oxygen.  DME owned (not currently used): none.  Upon discharge, patient will have assistance from family.    Objective:     Communicated with RN and OT prior to session.  Patient found HOB elevated with peripheral IV, telemetry, oxygen, CPAP  upon PT entry to room.    General Precautions: Standard, respiratory, fall   Orthopedic Precautions:N/A   Braces: N/A     Exams:  · Cognitive Exam:  Patient is oriented to Person, Place, Time and Situation  · RLE ROM: WFL  · RLE Strength: WFL  · LLE ROM: WFL  · LLE Strength: WFL    Functional Mobility:  · Bed Mobility:     · Rolling Right: supervision  · Scooting: supervision  · Supine to Sit: supervision  · Sit to Supine: supervision  · Transfers:     · Sit to Stand:  contact guard assistance with no AD  · Gait: 3 steps along EOB c HHAx1 CGA   · increased lateral trunk lean and unsteadiness  · Balance: standing (CGA)      Therapeutic Activities and Exercises:  Pt educated on: PT role/POC; safety c mobility; benefits of OOB activities; performing therex; d/c recs - v/u      AM-PAC 6 CLICK MOBILITY  Total Score:15     Patient left HOB elevated with all lines intact, call button in reach, RN notified and son present.    GOALS:   Multidisciplinary Problems     Physical Therapy Goals        Problem: Physical Therapy Goal    Goal Priority Disciplines Outcome Goal Variances Interventions   Physical Therapy Goal     PT, PT/OT Ongoing, Progressing     Description:  Goals to be met by: 2019     Patient will increase functional independence with mobility by performin. Supine to sit with Modified [place]  2. Sit to supine with Modified [place]  3. Sit to stand transfer with Supervision  4. Gait  x 75 feet with Stand-by Assistance using LRAD                      History:     Past Medical History:   Diagnosis Date     Arthritis     Asthma     Bronchitis     C. difficile colitis 11/1/2014    CHF (congestive heart failure)     COPD (chronic obstructive pulmonary disease)     Depression     Hyperlipidemia     Hypertension        Past Surgical History:   Procedure Laterality Date    CHOLECYSTECTOMY      EYE SURGERY      OVARIAN CYST REMOVAL         Time Tracking:     PT Received On: 11/08/19  PT Start Time: 1124     PT Stop Time: 1132  PT Total Time (min): 8 min     Billable Minutes: Evaluation 8 min      Mckinley López, PT  11/08/2019

## 2019-11-08 NOTE — PLAN OF CARE
11/08/19 1609   Post-Acute Status   Post-Acute Authorization Home Health/Hospice   Home Health/Hospice Status Awaiting Internal Medical Clearance   Discharge Delays None known at this time

## 2019-11-08 NOTE — ED NOTES
Pt requesting water. MD Rusty verbalized to keep pt NPO at this time and give a couple of ice chips in about 30 minutes. Pt and family members made aware.

## 2019-11-09 LAB
ALBUMIN SERPL BCP-MCNC: 3.1 G/DL (ref 3.5–5.2)
ALP SERPL-CCNC: 49 U/L (ref 55–135)
ALT SERPL W/O P-5'-P-CCNC: 13 U/L (ref 10–44)
ANION GAP SERPL CALC-SCNC: 4 MMOL/L (ref 8–16)
AST SERPL-CCNC: 16 U/L (ref 10–40)
BASOPHILS # BLD AUTO: 0.02 K/UL (ref 0–0.2)
BASOPHILS NFR BLD: 0.4 % (ref 0–1.9)
BILIRUB SERPL-MCNC: 0.3 MG/DL (ref 0.1–1)
BUN SERPL-MCNC: 16 MG/DL (ref 8–23)
CALCIUM SERPL-MCNC: 9.6 MG/DL (ref 8.7–10.5)
CHLORIDE SERPL-SCNC: 91 MMOL/L (ref 95–110)
CO2 SERPL-SCNC: 43 MMOL/L (ref 23–29)
CREAT SERPL-MCNC: 0.9 MG/DL (ref 0.5–1.4)
DIFFERENTIAL METHOD: ABNORMAL
EOSINOPHIL # BLD AUTO: 0.1 K/UL (ref 0–0.5)
EOSINOPHIL NFR BLD: 1.1 % (ref 0–8)
ERYTHROCYTE [DISTWIDTH] IN BLOOD BY AUTOMATED COUNT: 12.6 % (ref 11.5–14.5)
EST. GFR  (AFRICAN AMERICAN): >60 ML/MIN/1.73 M^2
EST. GFR  (NON AFRICAN AMERICAN): >60 ML/MIN/1.73 M^2
GLUCOSE SERPL-MCNC: 96 MG/DL (ref 70–110)
HCT VFR BLD AUTO: 36.8 % (ref 37–48.5)
HGB BLD-MCNC: 11.2 G/DL (ref 12–16)
IMM GRANULOCYTES # BLD AUTO: 0.01 K/UL (ref 0–0.04)
IMM GRANULOCYTES NFR BLD AUTO: 0.2 % (ref 0–0.5)
LYMPHOCYTES # BLD AUTO: 1.4 K/UL (ref 1–4.8)
LYMPHOCYTES NFR BLD: 25.3 % (ref 18–48)
MAGNESIUM SERPL-MCNC: 1.9 MG/DL (ref 1.6–2.6)
MCH RBC QN AUTO: 31.3 PG (ref 27–31)
MCHC RBC AUTO-ENTMCNC: 30.4 G/DL (ref 32–36)
MCV RBC AUTO: 103 FL (ref 82–98)
MONOCYTES # BLD AUTO: 0.4 K/UL (ref 0.3–1)
MONOCYTES NFR BLD: 7.5 % (ref 4–15)
NEUTROPHILS # BLD AUTO: 3.7 K/UL (ref 1.8–7.7)
NEUTROPHILS NFR BLD: 65.5 % (ref 38–73)
NRBC BLD-RTO: 0 /100 WBC
PHOSPHATE SERPL-MCNC: 2.7 MG/DL (ref 2.7–4.5)
PLATELET # BLD AUTO: 163 K/UL (ref 150–350)
PMV BLD AUTO: 10.6 FL (ref 9.2–12.9)
POTASSIUM SERPL-SCNC: 3.9 MMOL/L (ref 3.5–5.1)
PROT SERPL-MCNC: 5.8 G/DL (ref 6–8.4)
RBC # BLD AUTO: 3.58 M/UL (ref 4–5.4)
SODIUM SERPL-SCNC: 138 MMOL/L (ref 136–145)
WBC # BLD AUTO: 5.58 K/UL (ref 3.9–12.7)

## 2019-11-09 PROCEDURE — 25000242 PHARM REV CODE 250 ALT 637 W/ HCPCS: Performed by: STUDENT IN AN ORGANIZED HEALTH CARE EDUCATION/TRAINING PROGRAM

## 2019-11-09 PROCEDURE — 94660 CPAP INITIATION&MGMT: CPT

## 2019-11-09 PROCEDURE — 83735 ASSAY OF MAGNESIUM: CPT

## 2019-11-09 PROCEDURE — 25000003 PHARM REV CODE 250: Performed by: STUDENT IN AN ORGANIZED HEALTH CARE EDUCATION/TRAINING PROGRAM

## 2019-11-09 PROCEDURE — 80053 COMPREHEN METABOLIC PANEL: CPT

## 2019-11-09 PROCEDURE — 99232 PR SUBSEQUENT HOSPITAL CARE,LEVL II: ICD-10-PCS | Mod: ,,, | Performed by: HOSPITALIST

## 2019-11-09 PROCEDURE — 36415 COLL VENOUS BLD VENIPUNCTURE: CPT

## 2019-11-09 PROCEDURE — 11000001 HC ACUTE MED/SURG PRIVATE ROOM

## 2019-11-09 PROCEDURE — 84100 ASSAY OF PHOSPHORUS: CPT

## 2019-11-09 PROCEDURE — 94640 AIRWAY INHALATION TREATMENT: CPT

## 2019-11-09 PROCEDURE — 99232 SBSQ HOSP IP/OBS MODERATE 35: CPT | Mod: ,,, | Performed by: HOSPITALIST

## 2019-11-09 PROCEDURE — 85025 COMPLETE CBC W/AUTO DIFF WBC: CPT

## 2019-11-09 PROCEDURE — 63600175 PHARM REV CODE 636 W HCPCS: Performed by: STUDENT IN AN ORGANIZED HEALTH CARE EDUCATION/TRAINING PROGRAM

## 2019-11-09 PROCEDURE — 99900035 HC TECH TIME PER 15 MIN (STAT)

## 2019-11-09 PROCEDURE — 94761 N-INVAS EAR/PLS OXIMETRY MLT: CPT

## 2019-11-09 PROCEDURE — 27000221 HC OXYGEN, UP TO 24 HOURS

## 2019-11-09 PROCEDURE — 94667 MNPJ CHEST WALL 1ST: CPT

## 2019-11-09 PROCEDURE — 27000190 HC CPAP FULL FACE MASK W/VALVE

## 2019-11-09 RX ADMIN — IPRATROPIUM BROMIDE AND ALBUTEROL SULFATE 3 ML: .5; 3 SOLUTION RESPIRATORY (INHALATION) at 04:11

## 2019-11-09 RX ADMIN — ACETAMINOPHEN 325 MG: 325 TABLET ORAL at 09:11

## 2019-11-09 RX ADMIN — AZITHROMYCIN 500 MG: 250 TABLET, FILM COATED ORAL at 08:11

## 2019-11-09 RX ADMIN — ENOXAPARIN SODIUM 30 MG: 100 INJECTION SUBCUTANEOUS at 05:11

## 2019-11-09 RX ADMIN — FLUTICASONE FUROATE AND VILANTEROL TRIFENATATE 1 PUFF: 100; 25 POWDER RESPIRATORY (INHALATION) at 09:11

## 2019-11-09 RX ADMIN — SENNOSIDES AND DOCUSATE SODIUM 1 TABLET: 8.6; 5 TABLET ORAL at 09:11

## 2019-11-09 RX ADMIN — ACETAMINOPHEN 325 MG: 325 TABLET ORAL at 05:11

## 2019-11-09 RX ADMIN — GUAIFENESIN 600 MG: 600 TABLET, EXTENDED RELEASE ORAL at 09:11

## 2019-11-09 RX ADMIN — GUAIFENESIN 600 MG: 600 TABLET, EXTENDED RELEASE ORAL at 08:11

## 2019-11-09 RX ADMIN — PREDNISONE 40 MG: 10 TABLET ORAL at 09:11

## 2019-11-09 RX ADMIN — TIOTROPIUM BROMIDE 18 MCG: 18 CAPSULE ORAL; RESPIRATORY (INHALATION) at 09:11

## 2019-11-09 RX ADMIN — SENNOSIDES AND DOCUSATE SODIUM 1 TABLET: 8.6; 5 TABLET ORAL at 08:11

## 2019-11-09 RX ADMIN — IPRATROPIUM BROMIDE AND ALBUTEROL SULFATE 3 ML: .5; 3 SOLUTION RESPIRATORY (INHALATION) at 07:11

## 2019-11-09 RX ADMIN — IPRATROPIUM BROMIDE AND ALBUTEROL SULFATE 3 ML: .5; 3 SOLUTION RESPIRATORY (INHALATION) at 12:11

## 2019-11-09 RX ADMIN — RAMELTEON 8 MG: 8 TABLET ORAL at 10:11

## 2019-11-09 NOTE — SUBJECTIVE & OBJECTIVE
Interval History: NAEO. Slept with BiPAP on through out night. Reports intermittent SOB, however she overall feels somewhat better.     Review of Systems   Constitutional: Negative for chills, diaphoresis and fever.   HENT: Negative for rhinorrhea and sneezing.    Eyes: Negative for pain and visual disturbance.   Respiratory: Positive for cough and shortness of breath. Negative for choking.    Cardiovascular: Negative for chest pain, palpitations and leg swelling.   Gastrointestinal: Positive for abdominal pain (RLQ). Negative for blood in stool, constipation, diarrhea, nausea and vomiting.   Endocrine: Negative for cold intolerance and heat intolerance.   Genitourinary: Negative for dysuria, flank pain and hematuria.   Skin: Negative for rash and wound.   Neurological: Positive for headaches. Negative for dizziness, syncope and light-headedness.   Psychiatric/Behavioral: Negative for agitation, confusion and decreased concentration.     Objective:     Vital Signs (Most Recent):  Temp: 98 °F (36.7 °C) (11/09/19 1500)  Pulse: 91 (11/09/19 1528)  Resp: 16 (11/09/19 1500)  BP: 129/75 (11/09/19 1500)  SpO2: 98 % (11/09/19 1500) Vital Signs (24h Range):  Temp:  [97.6 °F (36.4 °C)-98.8 °F (37.1 °C)] 98 °F (36.7 °C)  Pulse:  [] 91  Resp:  [16-18] 16  SpO2:  [98 %-99 %] 98 %  BP: ()/(53-83) 129/75     Weight: 44 kg (97 lb)  Body mass index is 17.74 kg/m².    Intake/Output Summary (Last 24 hours) at 11/9/2019 1625  Last data filed at 11/9/2019 1500  Gross per 24 hour   Intake 1020 ml   Output 1200 ml   Net -180 ml      Physical Exam   Constitutional: She is oriented to person, place, and time. She appears well-developed and well-nourished. No distress.   HENT:   Head: Normocephalic and atraumatic.   Eyes: Conjunctivae and EOM are normal. No scleral icterus.   Neck: Normal range of motion. No JVD present.   Cardiovascular: Normal rate, regular rhythm, normal heart sounds and intact distal pulses. Exam reveals no  friction rub.   No murmur heard.  Pulmonary/Chest: No stridor. No respiratory distress. She has wheezes. She has no rales.   Abdominal: Soft. She exhibits no distension. There is tenderness (mild RLQ). There is no rebound and no guarding.   Musculoskeletal: Normal range of motion. She exhibits no edema.   Neurological: She is alert and oriented to person, place, and time.   Skin: Skin is warm and dry. She is not diaphoretic.   Psychiatric: She has a normal mood and affect. Her behavior is normal. Thought content normal.       Significant Labs:   ABGs:   Recent Labs   Lab 11/08/19  1722   PH 7.447   PCO2 62.5*   HCO3 43.2*   POCSATURATED 78*   BE 19     CBC:   Recent Labs   Lab 11/1955 11/07/19 2137 11/08/19  0802 11/09/19  0832   WBC 7.48  --  5.26 5.58   HGB 12.7  --  11.7* 11.2*   HCT 42.6 37 38.8 36.8*     --  184 163     CMP:   Recent Labs   Lab 11/1955 11/08/19  0802 11/09/19  0832    141 138   K 5.0 5.4* 3.9   CL 90* 95 91*   CO2 42* 40* 43*   * 124* 96   BUN 18 16 16   CREATININE 0.9 0.8 0.9   CALCIUM 9.3 9.2 9.6   PROT 6.4 6.3 5.8*   ALBUMIN 3.2* 3.2* 3.1*   BILITOT 0.2 0.3 0.3   ALKPHOS 62 54* 49*   AST 18 18 16   ALT 15 14 13   ANIONGAP 9 6* 4*   EGFRNONAA >60.0 >60.0 >60.0     POCT Glucose:   Recent Labs   Lab 11/08/19  0401   POCTGLUCOSE 141*       Significant Imaging: I have reviewed all pertinent imaging results/findings within the past 24 hours.

## 2019-11-09 NOTE — PROGRESS NOTES
[facility] Hospital Medicine  Progress Note    Patient Name: Sonya Buckner  MRN: [id number]  Patient Class: IP- Inpatient   Admission Date: 11/7/2019  Length of Stay: 2 days  Attending Physician: Cam Brian MD  Primary Care Provider: Will Monaco MD    [facility] Medicine Team: [facility] HOSP MED 1 Delfina Juarez MD    Subjective:     Principal Problem:Acute on chronic respiratory failure with hypoxia and hypercapnia        HPI:  Ms Heredia is a 65 yo F with COPD, HFpEF (EF 60% 2018), asthma, and htn who presented to the ED on 11/8 in acute respiratory distress. Family is present with patient and states that she has been increasingly altered over the past 3 weeks but that it has been more pronounced today. They report that the patient is conversational at baseline but has been more combative and altered lately. She is on 2-3L NC at home but report that she desaturated to the 60s on 2L at home which prompted them to bring her to the hospital. Of note, patient presented to [place] one week ago with similar symptoms. Patient is also complaining of RLQ abdominal pain that has been present over the same timeline as well.    On examination in ED, patient was lethargic but alert and oriented. She was on CPAP satting 99% in ED and then on BiPaP. Patient denies any fever, chills, dyspnea, BV, dysuria, hematuria. Patient is a current and long time smoker, down to 1PPW. Family denies any drinking or drug use other than remote marijuana use.        Overview/Hospital Course:  Patient admitted for acute hypercapnic resp failure 2/2 to COPD exacerbation in the setting of medication non-compliance. Received IV solumedrol and placed on BiPAP in ED. She is being treated with duonebs q4hrs, PO steroids, BiPAP qHS and azithromycin. Given her ABG on room air- CO2- 62, pH 7.4, O2 saturation 78, she will require BiPAP nightly at home. Performed Abd US to evaluate for abdominal pain which was unrevealing for hernia  or abnormality. Wet prep for vaginal irritation and secretion pending.     Interval History: NAEO. Slept with BiPAP on through out night. Reports intermittent SOB, however she overall feels somewhat better.     Review of Systems   Constitutional: Negative for chills, diaphoresis and fever.   HENT: Negative for rhinorrhea and sneezing.    Eyes: Negative for pain and visual disturbance.   Respiratory: Positive for cough and shortness of breath. Negative for choking.    Cardiovascular: Negative for chest pain, palpitations and leg swelling.   Gastrointestinal: Positive for abdominal pain (RLQ). Negative for blood in stool, constipation, diarrhea, nausea and vomiting.   Endocrine: Negative for cold intolerance and heat intolerance.   Genitourinary: Negative for dysuria, flank pain and hematuria.   Skin: Negative for rash and wound.   Neurological: Positive for headaches. Negative for dizziness, syncope and light-headedness.   Psychiatric/Behavioral: Negative for agitation, confusion and decreased concentration.     Objective:     Vital Signs (Most Recent):  Temp: 98 °F (36.7 °C) (11/09/19 1500)  Pulse: 91 (11/09/19 1528)  Resp: 16 (11/09/19 1500)  BP: 129/75 (11/09/19 1500)  SpO2: 98 % (11/09/19 1500) Vital Signs (24h Range):  Temp:  [97.6 °F (36.4 °C)-98.8 °F (37.1 °C)] 98 °F (36.7 °C)  Pulse:  [] 91  Resp:  [16-18] 16  SpO2:  [98 %-99 %] 98 %  BP: ()/(53-83) 129/75     Weight: 44 kg (97 lb)  Body mass index is 17.74 kg/m².    Intake/Output Summary (Last 24 hours) at 11/9/2019 1625  Last data filed at 11/9/2019 1500  Gross per 24 hour   Intake 1020 ml   Output 1200 ml   Net -180 ml      Physical Exam   Constitutional: She is oriented to person, place, and time. She appears well-developed and well-nourished. No distress.   HENT:   Head: Normocephalic and atraumatic.   Eyes: Conjunctivae and EOM are normal. No scleral icterus.   Neck: Normal range of motion. No JVD present.   Cardiovascular: Normal rate,  regular rhythm, normal heart sounds and intact distal pulses. Exam reveals no friction rub.   No murmur heard.  Pulmonary/Chest: No stridor. No respiratory distress. She has wheezes. She has no rales.   Abdominal: Soft. She exhibits no distension. There is tenderness (mild RLQ). There is no rebound and no guarding.   Musculoskeletal: Normal range of motion. She exhibits no edema.   Neurological: She is alert and oriented to person, place, and time.   Skin: Skin is warm and dry. She is not diaphoretic.   Psychiatric: She has a normal mood and affect. Her behavior is normal. Thought content normal.       Significant Labs:   ABGs:   Recent Labs   Lab 11/08/19  1722   PH 7.447   PCO2 62.5*   HCO3 43.2*   POCSATURATED 78*   BE 19     CBC:   Recent Labs   Lab 11/1957 11/07/19 2137 11/08/19  0802 11/09/19  0832   WBC 7.48  --  5.26 5.58   HGB 12.7  --  11.7* 11.2*   HCT 42.6 37 38.8 36.8*     --  184 163     CMP:   Recent Labs   Lab 11/1957 11/08/19  0802 11/09/19  0832    141 138   K 5.0 5.4* 3.9   CL 90* 95 91*   CO2 42* 40* 43*   * 124* 96   BUN 18 16 16   CREATININE 0.9 0.8 0.9   CALCIUM 9.3 9.2 9.6   PROT 6.4 6.3 5.8*   ALBUMIN 3.2* 3.2* 3.1*   BILITOT 0.2 0.3 0.3   ALKPHOS 62 54* 49*   AST 18 18 16   ALT 15 14 13   ANIONGAP 9 6* 4*   EGFRNONAA >60.0 >60.0 >60.0     POCT Glucose:   Recent Labs   Lab 11/08/19  0401   POCTGLUCOSE 141*       Significant Imaging: I have reviewed all pertinent imaging results/findings within the past 24 hours.      Assessment/Plan:      * Acute on chronic respiratory failure with hypoxia and hypercapnia  Patient presenting with hypercapnic respiratory failure likely 2/2 to COPD exacerbation likely due to medication non-adherence. Daughter stated patient hasn't been keeping up with her maintenance therapy.     CXR demonstrated Pulmonary emphysema without detrimental change or radiographic acute intrathoracic process seen.  Specifically, no focal  consolidation.    On presentation, VBG initially pH 7.264 with CO2 114 and HCO3 52. 7.344/88/48 on BiPAP.  ABG on room air -pH 7.4, CO2- 62, O2 sat- 78,   Resp infection panel- negative    Plan:  -BiPAP qHS  -Wean O2 as tolerated, with O2 goals of 88-92%.   - Continue azithro x5days  - Continue steroids x5 days  -Will require BiPAP qHS at home given her severe COPD.   -Updated her daughter with the plan of treatment and importance of BiPAP at home.      (HFpEF) heart failure with preserved ejection fraction  Last EF 60% in 2018    Currently holding home bblocker and lasix      Essential hypertension  Holding Home meds: carvedilol 3.125 bid, lasix 40 po, losartan 100  -BP stable.       COPD with acute exacerbation  See acute hypercapnic resp failure      VTE Risk Mitigation (From admission, onward)         Ordered     enoxaparin injection 30 mg  Daily      11/08/19 0928     Place sequential compression device  Until discontinued      11/08/19 0300     IP VTE HIGH RISK PATIENT  Once      11/08/19 0300                      Delfina Juarez MD  Department of Hospital Medicine   [facility]

## 2019-11-09 NOTE — PLAN OF CARE
No acute events throughout night. Pt AAO X 4; able to express needs.  C/o pain.  Analgesic given as ordered.  Tolerated Bi Pap throughout the night.   Safety maintained.  Bed in low position,  call  light in reach.    Will continue to monitor

## 2019-11-09 NOTE — PLAN OF CARE
Problem: Fall Injury Risk  Goal: Absence of Fall and Fall-Related Injury  Outcome: Ongoing, Not Progressing     Problem: Adult Inpatient Plan of Care  Goal: Plan of Care Review  Outcome: Ongoing, Not Progressing  Goal: Patient-Specific Goal (Individualization)  Outcome: Ongoing, Not Progressing  Goal: Absence of Hospital-Acquired Illness or Injury  Outcome: Ongoing, Not Progressing  Goal: Optimal Comfort and Wellbeing  Outcome: Ongoing, Not Progressing  Goal: Readiness for Transition of Care  Outcome: Ongoing, Not Progressing  Goal: Rounds/Family Conference  Outcome: Ongoing, Not Progressing     Problem: Skin Injury Risk Increased  Goal: Skin Health and Integrity  Outcome: Ongoing, Not Progressing     Problem: Fall Injury Risk  Goal: Absence of Fall and Fall-Related Injury  Outcome: Ongoing, Not Progressing     Problem: Adult Inpatient Plan of Care  Goal: Plan of Care Review  Outcome: Ongoing, Not Progressing  Goal: Patient-Specific Goal (Individualization)  Outcome: Ongoing, Not Progressing  Goal: Absence of Hospital-Acquired Illness or Injury  Outcome: Ongoing, Not Progressing  Goal: Optimal Comfort and Wellbeing  Outcome: Ongoing, Not Progressing  Goal: Readiness for Transition of Care  Outcome: Ongoing, Not Progressing  Goal: Rounds/Family Conference  Outcome: Ongoing, Not Progressing     Problem: Skin Injury Risk Increased  Goal: Skin Health and Integrity  Outcome: Ongoing, Not Progressing

## 2019-11-09 NOTE — ASSESSMENT & PLAN NOTE
Patient presenting with hypercapnic respiratory failure likely 2/2 to COPD exacerbation likely due to medication non-adherence. Daughter stated patient hasn't been keeping up with her maintenance therapy.     CXR demonstrated Pulmonary emphysema without detrimental change or radiographic acute intrathoracic process seen.  Specifically, no focal consolidation.    On presentation, VBG initially pH 7.264 with CO2 114 and HCO3 52. 7.344/88/48 on BiPAP.  ABG on room air -pH 7.4, CO2- 62, O2 sat- 78,   Resp infection panel- negative    Plan:  -BiPAP qHS  -Wean O2 as tolerated, with O2 goals of 88-92%.   - Continue azithro x5days  - Continue steroids x5 days  -Will require BiPAP qHS at home given her severe COPD.   -Updated her daughter with the plan of treatment and importance of BiPAP at home.

## 2019-11-10 PROBLEM — F32.A DEPRESSION: Status: ACTIVE | Noted: 2019-11-10

## 2019-11-10 PROBLEM — F33.9 RECURRENT MAJOR DEPRESSIVE DISORDER: Status: RESOLVED | Noted: 2019-11-10 | Resolved: 2019-11-10

## 2019-11-10 PROBLEM — Z72.0 NICOTINE ABUSE: Status: RESOLVED | Noted: 2018-02-05 | Resolved: 2019-11-10

## 2019-11-10 PROBLEM — F33.9 RECURRENT MAJOR DEPRESSIVE DISORDER: Status: ACTIVE | Noted: 2019-11-10

## 2019-11-10 PROBLEM — R79.81: Status: RESOLVED | Noted: 2017-09-21 | Resolved: 2019-11-10

## 2019-11-10 PROBLEM — I27.20 PULMONARY HYPERTENSION: Status: ACTIVE | Noted: 2019-11-10

## 2019-11-10 LAB
ALBUMIN SERPL BCP-MCNC: 2.7 G/DL (ref 3.5–5.2)
ALP SERPL-CCNC: 46 U/L (ref 55–135)
ALT SERPL W/O P-5'-P-CCNC: 12 U/L (ref 10–44)
ANION GAP SERPL CALC-SCNC: 4 MMOL/L (ref 8–16)
AST SERPL-CCNC: 13 U/L (ref 10–40)
BASOPHILS # BLD AUTO: 0.01 K/UL (ref 0–0.2)
BASOPHILS NFR BLD: 0.2 % (ref 0–1.9)
BILIRUB SERPL-MCNC: 0.3 MG/DL (ref 0.1–1)
BUN SERPL-MCNC: 17 MG/DL (ref 8–23)
CALCIUM SERPL-MCNC: 8.9 MG/DL (ref 8.7–10.5)
CHLORIDE SERPL-SCNC: 96 MMOL/L (ref 95–110)
CO2 SERPL-SCNC: 39 MMOL/L (ref 23–29)
CREAT SERPL-MCNC: 0.8 MG/DL (ref 0.5–1.4)
DIFFERENTIAL METHOD: ABNORMAL
EOSINOPHIL # BLD AUTO: 0 K/UL (ref 0–0.5)
EOSINOPHIL NFR BLD: 0.7 % (ref 0–8)
ERYTHROCYTE [DISTWIDTH] IN BLOOD BY AUTOMATED COUNT: 12.6 % (ref 11.5–14.5)
EST. GFR  (AFRICAN AMERICAN): >60 ML/MIN/1.73 M^2
EST. GFR  (NON AFRICAN AMERICAN): >60 ML/MIN/1.73 M^2
GLUCOSE SERPL-MCNC: 93 MG/DL (ref 70–110)
HCT VFR BLD AUTO: 33.1 % (ref 37–48.5)
HGB BLD-MCNC: 10.3 G/DL (ref 12–16)
IMM GRANULOCYTES # BLD AUTO: 0.02 K/UL (ref 0–0.04)
IMM GRANULOCYTES NFR BLD AUTO: 0.3 % (ref 0–0.5)
LYMPHOCYTES # BLD AUTO: 1.6 K/UL (ref 1–4.8)
LYMPHOCYTES NFR BLD: 27 % (ref 18–48)
MAGNESIUM SERPL-MCNC: 1.9 MG/DL (ref 1.6–2.6)
MCH RBC QN AUTO: 31.9 PG (ref 27–31)
MCHC RBC AUTO-ENTMCNC: 31.1 G/DL (ref 32–36)
MCV RBC AUTO: 103 FL (ref 82–98)
MONOCYTES # BLD AUTO: 0.5 K/UL (ref 0.3–1)
MONOCYTES NFR BLD: 7.7 % (ref 4–15)
NEUTROPHILS # BLD AUTO: 3.8 K/UL (ref 1.8–7.7)
NEUTROPHILS NFR BLD: 64.1 % (ref 38–73)
NRBC BLD-RTO: 0 /100 WBC
PHOSPHATE SERPL-MCNC: 3.2 MG/DL (ref 2.7–4.5)
PLATELET # BLD AUTO: 153 K/UL (ref 150–350)
PMV BLD AUTO: 10.5 FL (ref 9.2–12.9)
POTASSIUM SERPL-SCNC: 4.2 MMOL/L (ref 3.5–5.1)
PROT SERPL-MCNC: 5.1 G/DL (ref 6–8.4)
RBC # BLD AUTO: 3.23 M/UL (ref 4–5.4)
SODIUM SERPL-SCNC: 139 MMOL/L (ref 136–145)
WBC # BLD AUTO: 5.86 K/UL (ref 3.9–12.7)

## 2019-11-10 PROCEDURE — 25000003 PHARM REV CODE 250: Performed by: STUDENT IN AN ORGANIZED HEALTH CARE EDUCATION/TRAINING PROGRAM

## 2019-11-10 PROCEDURE — 80053 COMPREHEN METABOLIC PANEL: CPT

## 2019-11-10 PROCEDURE — 83735 ASSAY OF MAGNESIUM: CPT

## 2019-11-10 PROCEDURE — 36415 COLL VENOUS BLD VENIPUNCTURE: CPT

## 2019-11-10 PROCEDURE — 11000001 HC ACUTE MED/SURG PRIVATE ROOM

## 2019-11-10 PROCEDURE — 94660 CPAP INITIATION&MGMT: CPT

## 2019-11-10 PROCEDURE — 25000242 PHARM REV CODE 250 ALT 637 W/ HCPCS: Performed by: STUDENT IN AN ORGANIZED HEALTH CARE EDUCATION/TRAINING PROGRAM

## 2019-11-10 PROCEDURE — 99900035 HC TECH TIME PER 15 MIN (STAT)

## 2019-11-10 PROCEDURE — 94761 N-INVAS EAR/PLS OXIMETRY MLT: CPT

## 2019-11-10 PROCEDURE — 85025 COMPLETE CBC W/AUTO DIFF WBC: CPT

## 2019-11-10 PROCEDURE — 94640 AIRWAY INHALATION TREATMENT: CPT

## 2019-11-10 PROCEDURE — 99232 SBSQ HOSP IP/OBS MODERATE 35: CPT | Mod: ,,, | Performed by: HOSPITALIST

## 2019-11-10 PROCEDURE — 63600175 PHARM REV CODE 636 W HCPCS: Performed by: STUDENT IN AN ORGANIZED HEALTH CARE EDUCATION/TRAINING PROGRAM

## 2019-11-10 PROCEDURE — 99232 PR SUBSEQUENT HOSPITAL CARE,LEVL II: ICD-10-PCS | Mod: ,,, | Performed by: HOSPITALIST

## 2019-11-10 PROCEDURE — 84100 ASSAY OF PHOSPHORUS: CPT

## 2019-11-10 PROCEDURE — 27000190 HC CPAP FULL FACE MASK W/VALVE

## 2019-11-10 PROCEDURE — 27000221 HC OXYGEN, UP TO 24 HOURS

## 2019-11-10 RX ORDER — IPRATROPIUM BROMIDE AND ALBUTEROL SULFATE 2.5; .5 MG/3ML; MG/3ML
3 SOLUTION RESPIRATORY (INHALATION)
Status: DISCONTINUED | OUTPATIENT
Start: 2019-11-10 | End: 2019-11-12

## 2019-11-10 RX ORDER — ESCITALOPRAM OXALATE 20 MG/1
20 TABLET ORAL DAILY
Status: DISCONTINUED | OUTPATIENT
Start: 2019-11-10 | End: 2019-11-12

## 2019-11-10 RX ORDER — FUROSEMIDE 40 MG/1
40 TABLET ORAL DAILY
Status: DISCONTINUED | OUTPATIENT
Start: 2019-11-10 | End: 2019-11-12 | Stop reason: HOSPADM

## 2019-11-10 RX ADMIN — FUROSEMIDE 40 MG: 40 TABLET ORAL at 12:11

## 2019-11-10 RX ADMIN — PREDNISONE 40 MG: 10 TABLET ORAL at 09:11

## 2019-11-10 RX ADMIN — ENOXAPARIN SODIUM 30 MG: 100 INJECTION SUBCUTANEOUS at 04:11

## 2019-11-10 RX ADMIN — ESCITALOPRAM OXALATE 20 MG: 20 TABLET ORAL at 12:11

## 2019-11-10 RX ADMIN — IPRATROPIUM BROMIDE AND ALBUTEROL SULFATE 3 ML: .5; 3 SOLUTION RESPIRATORY (INHALATION) at 08:11

## 2019-11-10 RX ADMIN — RAMELTEON 8 MG: 8 TABLET ORAL at 08:11

## 2019-11-10 RX ADMIN — FLUTICASONE FUROATE AND VILANTEROL TRIFENATATE 1 PUFF: 100; 25 POWDER RESPIRATORY (INHALATION) at 09:11

## 2019-11-10 RX ADMIN — SENNOSIDES AND DOCUSATE SODIUM 1 TABLET: 8.6; 5 TABLET ORAL at 08:11

## 2019-11-10 RX ADMIN — ACETAMINOPHEN 325 MG: 325 TABLET ORAL at 12:11

## 2019-11-10 RX ADMIN — AZITHROMYCIN 500 MG: 250 TABLET, FILM COATED ORAL at 08:11

## 2019-11-10 RX ADMIN — ACETAMINOPHEN 325 MG: 325 TABLET ORAL at 10:11

## 2019-11-10 RX ADMIN — TIOTROPIUM BROMIDE 18 MCG: 18 CAPSULE ORAL; RESPIRATORY (INHALATION) at 09:11

## 2019-11-10 RX ADMIN — GUAIFENESIN 600 MG: 600 TABLET, EXTENDED RELEASE ORAL at 08:11

## 2019-11-10 RX ADMIN — GUAIFENESIN 600 MG: 600 TABLET, EXTENDED RELEASE ORAL at 09:11

## 2019-11-10 RX ADMIN — SENNOSIDES AND DOCUSATE SODIUM 1 TABLET: 8.6; 5 TABLET ORAL at 09:11

## 2019-11-10 NOTE — SUBJECTIVE & OBJECTIVE
Interval History: NAEON. Used BiPAP on throughout night, and could not sleep well with it 2/2 leak. Will attempt to have better fitted mask or problem addressed. Will get ECHO, as it has been 1.5 years and patient has diastolic HF with pHTN of 67 at that time, which is likely contributing. D/c tomorrow after home BiPAP approval     Review of Systems   Constitutional: Negative for activity change, chills, diaphoresis and fever.   HENT: Negative for rhinorrhea, sneezing, sore throat and trouble swallowing.    Eyes: Negative for pain and visual disturbance.   Respiratory: Positive for cough and shortness of breath. Negative for choking and chest tightness.    Cardiovascular: Negative for chest pain, palpitations and leg swelling.   Gastrointestinal: Positive for abdominal pain (RLQ). Negative for abdominal distention, blood in stool, constipation, diarrhea, nausea and vomiting.   Endocrine: Negative for cold intolerance, heat intolerance and polyuria.   Genitourinary: Positive for vaginal discharge. Negative for difficulty urinating, dysuria, flank pain and hematuria.   Musculoskeletal: Negative for neck pain and neck stiffness.   Skin: Negative for rash and wound.   Allergic/Immunologic: Negative for immunocompromised state.   Neurological: Positive for headaches. Negative for dizziness, seizures, syncope, facial asymmetry and light-headedness.   Psychiatric/Behavioral: Negative for agitation, behavioral problems, confusion and decreased concentration.     Objective:     Vital Signs (Most Recent):  Temp: 98.7 °F (37.1 °C) (11/10/19 0815)  Pulse: 82 (11/10/19 1123)  Resp: 20 (11/10/19 0948)  BP: 125/71 (11/10/19 0815)  SpO2: 100 % (11/10/19 0815) Vital Signs (24h Range):  Temp:  [97.8 °F (36.6 °C)-98.9 °F (37.2 °C)] 98.7 °F (37.1 °C)  Pulse:  [] 82  Resp:  [16-20] 20  SpO2:  [98 %-100 %] 100 %  BP: (100-130)/(60-76) 125/71     Weight: 44 kg (97 lb)  Body mass index is 17.74 kg/m².    Intake/Output Summary (Last  24 hours) at 11/10/2019 1346  Last data filed at 11/9/2019 1700  Gross per 24 hour   Intake 240 ml   Output 600 ml   Net -360 ml      Physical Exam   Constitutional: She is oriented to person, place, and time. She appears well-developed and well-nourished. No distress.   HENT:   Head: Normocephalic and atraumatic.   Eyes: Conjunctivae and EOM are normal. No scleral icterus.   Neck: Normal range of motion. No JVD present.   Cardiovascular: Normal rate, regular rhythm, normal heart sounds and intact distal pulses. Exam reveals no friction rub.   No murmur heard.  Pulmonary/Chest: Effort normal and breath sounds normal. No stridor. No respiratory distress. She has no wheezes. She has no rales.   Abdominal: Soft. She exhibits no distension. There is tenderness (mild tenderness in bladder region). There is no rebound and no guarding.   Musculoskeletal: Normal range of motion. She exhibits no edema.   Neurological: She is alert and oriented to person, place, and time. No cranial nerve deficit.   Skin: Skin is warm and dry. Capillary refill takes 2 to 3 seconds. She is not diaphoretic.   Psychiatric:   Withdrawn   Nursing note and vitals reviewed.      Significant Labs:   ABGs:   Recent Labs   Lab 11/08/19  1722   PH 7.447   PCO2 62.5*   HCO3 43.2*   POCSATURATED 78*   BE 19     CBC:   Recent Labs   Lab 11/09/19  0832   WBC 5.58   HGB 11.2*   HCT 36.8*        CMP:   Recent Labs   Lab 11/09/19  0832      K 3.9   CL 91*   CO2 43*   GLU 96   BUN 16   CREATININE 0.9   CALCIUM 9.6   PROT 5.8*   ALBUMIN 3.1*   BILITOT 0.3   ALKPHOS 49*   AST 16   ALT 13   ANIONGAP 4*   EGFRNONAA >60.0     No results found for this or any previous visit (from the past 24 hour(s)).   Microbiology Results (last 7 days)     Procedure Component Value Units Date/Time    Blood culture x two cultures. Draw prior to antibiotics. [[id number]] Collected:  11/07/19 1958    Order Status:  Completed Specimen:  Blood from Peripheral, Antecubital,  Right Updated:  11/09/19 2022     Blood Culture, Routine No Growth to date      No Growth to date      No Growth to date    Narrative:       Aerobic and anaerobic    Blood culture x two cultures. Draw prior to antibiotics. [[id number]] Collected:  11/1957    Order Status:  Completed Specimen:  Blood from Peripheral, Antecubital, Left Updated:  11/09/19 2022     Blood Culture, Routine No Growth to date      No Growth to date      No Growth to date    Narrative:       Aerobic and anaerobic    Vaginosis Screen by DNA Probe [[id number]] Collected:  11/08/19 1810    Order Status:  No result Updated:  11/08/19 2154    Respiratory Infection Panel, Nasopharyngeal [[id number]] Collected:  11/08/19 1100    Order Status:  Completed Specimen:  Nasopharyngeal Swab Updated:  11/08/19 1742     Respiratory Infection Panel Source NP Swab     Adenovirus Not Detected     Coronavirus 229E Not Detected     Coronavirus HKU1 Not Detected     Coronavirus NL63 Not Detected     Coronavirus OC43 Not Detected     Human Metapneumovirus Not Detected     Human Rhinovirus/Enterovirus Not Detected     Influenza A (subtypes H1, H1-2009,H3) Not Detected     Influenza B Not Detected     Parainfluenza Virus 1 Not Detected     Parainfluenza Virus 2 Not Detected     Parainfluenza Virus 3 Not Detected     Parainfluenza Virus 4 Not Detected     Respiratory Syncytial Virus Not Detected     Bordetella Parapertussis (BF4313) Not Detected     Bordetella pertussis (ptxP) Not Detected     Chlamydia pneumoniae Not Detected     Mycoplasma pneumoniae Not Detected    Narrative:       For all other respiratory sources order [id number] Respiratory  Viral Panel by PCR (RVPCR)    Culture, Respiratory with Gram Stain [[id number]]     Order Status:  Canceled Specimen:  Respiratory     Culture, Respiratory with Gram Stain [[id number]]     Order Status:  No result Specimen:  Respiratory     Influenza A & B by Molecular [[id number]] Collected:  11/07/19 2000    Order Status:   Sent Specimen:  Nasopharyngeal Swab Updated:  11/07/19 2136        Imaging Results          X-Ray Chest AP Portable (Final result)  Result time 11/07/19 20:48:09    Final result by Matti Lord MD (11/07/19 20:48:09)                 Impression:      Pulmonary emphysema without detrimental change or radiographic acute intrathoracic process seen.  Specifically, no focal consolidation.      Electronically signed by: Matti Lord MD  Date:    11/07/2019  Time:    20:48             Narrative:    EXAMINATION:  XR CHEST AP PORTABLE    CLINICAL HISTORY:  Sepsis;    TECHNIQUE:  Single frontal view of the chest was performed.    COMPARISON:  Chest radiograph 10/20/2019    FINDINGS:  Monitoring leads and tubing overlie the chest.  Patient is slightly rotated.  No detrimental change.    Cardiomediastinal silhouette is midline and within normal limits.  The lungs are symmetrically hyperexpanded with mild diffuse nonspecific interstitial coarsening suggesting sequela of underlying pulmonary emphysema similar to prior.  No large consolidation or new focal opacity definitively seen.  No pleural effusion or pneumothorax.  Dextroscoliosis of the thoracic spine similar to prior.  No acute osseous process seen.                              ECHO 2/2018:  CONCLUSIONS     1 - Normal left ventricular systolic function (EF 60-65%).     2 - Indeterminate LV diastolic function.     3 - Right atrial enlargement.     4 - Right ventricular enlargement with hypertrophy,        with mildly depressed systolic function.     5 - Pulmonary hypertension.        The estimated PA systolic pressure is 67 mmHg.     6 - Moderate tricuspid regurgitation.     7 - Small pericardial effusion.     8 - Increased central venous pressure.

## 2019-11-10 NOTE — PLAN OF CARE
Problem: Fall Injury Risk  Goal: Absence of Fall and Fall-Related Injury  Outcome: Ongoing, Not Progressing     Problem: Adult Inpatient Plan of Care  Goal: Plan of Care Review  Outcome: Ongoing, Not Progressing  Goal: Patient-Specific Goal (Individualization)  Outcome: Ongoing, Not Progressing  Goal: Absence of Hospital-Acquired Illness or Injury  Outcome: Ongoing, Not Progressing  Goal: Optimal Comfort and Wellbeing  Outcome: Ongoing, Not Progressing  Goal: Readiness for Transition of Care  Outcome: Ongoing, Not Progressing  Goal: Rounds/Family Conference  Outcome: Ongoing, Not Progressing     Problem: Skin Injury Risk Increased  Goal: Skin Health and Integrity  Outcome: Ongoing, Not Progressing

## 2019-11-10 NOTE — PROGRESS NOTES
[facility] Hospital Medicine  Progress Note    Patient Name: Sonya Buckner  MRN: [id number]  Patient Class: IP- Inpatient   Admission Date: 11/7/2019  Length of Stay: 3 days  Attending Physician: Cam Brian MD  Primary Care Provider: Will Monaco MD    [facility] Medicine Team: [facility] 1 Jose Padilla MD    Subjective:     Principal Problem:Acute on chronic respiratory failure with hypoxia and hypercapnia        HPI:  Ms Heredia is a 63 yo F with COPD, HFpEF (EF 60% 2018), asthma, and htn who presented to the ED on 11/8 in acute respiratory distress. Family is present with patient and states that she has been increasingly altered over the past 3 weeks but that it has been more pronounced today. They report that the patient is conversational at baseline but has been more combative and altered lately. She is on 2-3L NC at home but report that she desaturated to the 60s on 2L at home which prompted them to bring her to the hospital. Of note, patient presented to [place] one week ago with similar symptoms. Patient is also complaining of RLQ abdominal pain that has been present over the same timeline as well.    On examination in ED, patient was lethargic but alert and oriented. She was on CPAP satting 99% in ED and then on BiPaP. Patient denies any fever, chills, dyspnea, BV, dysuria, hematuria. Patient is a current and long time smoker, down to 1PPW. Family denies any drinking or drug use other than remote marijuana use.        Overview/Hospital Course:  Patient admitted for acute hypercapnic resp failure 2/2 to COPD exacerbation in the setting of medication non-compliance. Received IV solumedrol and placed on BiPAP in ED. She is being treated with duonebs q4hrs, PO steroids, BiPAP qHS and azithromycin. Given her ABG on room air- CO2- 62, pH 7.4, O2 saturation 78, she will require BiPAP nightly at home. Performed Abd US to evaluate for abdominal pain which was unrevealing for hernia  or abnormality. Wet prep for vaginal irritation and secretion pending.     Interval History: NAEON. Used BiPAP on throughout night, and could not sleep well with it 2/2 leak. Will attempt to have better fitted mask or problem addressed. Will get ECHO, as it has been 1.5 years and patient has diastolic HF with pHTN of 67 at that time, which is likely contributing. D/c tomorrow after home BiPAP approval     Review of Systems   Constitutional: Negative for activity change, chills, diaphoresis and fever.   HENT: Negative for rhinorrhea, sneezing, sore throat and trouble swallowing.    Eyes: Negative for pain and visual disturbance.   Respiratory: Positive for cough and shortness of breath. Negative for choking and chest tightness.    Cardiovascular: Negative for chest pain, palpitations and leg swelling.   Gastrointestinal: Positive for abdominal pain (RLQ). Negative for abdominal distention, blood in stool, constipation, diarrhea, nausea and vomiting.   Endocrine: Negative for cold intolerance, heat intolerance and polyuria.   Genitourinary: Positive for vaginal discharge. Negative for difficulty urinating, dysuria, flank pain and hematuria.   Musculoskeletal: Negative for neck pain and neck stiffness.   Skin: Negative for rash and wound.   Allergic/Immunologic: Negative for immunocompromised state.   Neurological: Positive for headaches. Negative for dizziness, seizures, syncope, facial asymmetry and light-headedness.   Psychiatric/Behavioral: Negative for agitation, behavioral problems, confusion and decreased concentration.     Objective:     Vital Signs (Most Recent):  Temp: 98.7 °F (37.1 °C) (11/10/19 0815)  Pulse: 82 (11/10/19 1123)  Resp: 20 (11/10/19 0948)  BP: 125/71 (11/10/19 0815)  SpO2: 100 % (11/10/19 0815) Vital Signs (24h Range):  Temp:  [97.8 °F (36.6 °C)-98.9 °F (37.2 °C)] 98.7 °F (37.1 °C)  Pulse:  [] 82  Resp:  [16-20] 20  SpO2:  [98 %-100 %] 100 %  BP: (100-130)/(60-76) 125/71     Weight: 44  kg (97 lb)  Body mass index is 17.74 kg/m².    Intake/Output Summary (Last 24 hours) at 11/10/2019 1346  Last data filed at 11/9/2019 1700  Gross per 24 hour   Intake 240 ml   Output 600 ml   Net -360 ml      Physical Exam   Constitutional: She is oriented to person, place, and time. She appears well-developed and well-nourished. No distress.   HENT:   Head: Normocephalic and atraumatic.   Eyes: Conjunctivae and EOM are normal. No scleral icterus.   Neck: Normal range of motion. No JVD present.   Cardiovascular: Normal rate, regular rhythm, normal heart sounds and intact distal pulses. Exam reveals no friction rub.   No murmur heard.  Pulmonary/Chest: Effort normal and breath sounds normal. No stridor. No respiratory distress. She has no wheezes. She has no rales.   Abdominal: Soft. She exhibits no distension. There is tenderness (mild tenderness in bladder region). There is no rebound and no guarding.   Musculoskeletal: Normal range of motion. She exhibits no edema.   Neurological: She is alert and oriented to person, place, and time. No cranial nerve deficit.   Skin: Skin is warm and dry. Capillary refill takes 2 to 3 seconds. She is not diaphoretic.   Psychiatric:   Withdrawn   Nursing note and vitals reviewed.      Significant Labs:   ABGs:   Recent Labs   Lab 11/08/19  1722   PH 7.447   PCO2 62.5*   HCO3 43.2*   POCSATURATED 78*   BE 19     CBC:   Recent Labs   Lab 11/09/19  0832   WBC 5.58   HGB 11.2*   HCT 36.8*        CMP:   Recent Labs   Lab 11/09/19  0832      K 3.9   CL 91*   CO2 43*   GLU 96   BUN 16   CREATININE 0.9   CALCIUM 9.6   PROT 5.8*   ALBUMIN 3.1*   BILITOT 0.3   ALKPHOS 49*   AST 16   ALT 13   ANIONGAP 4*   EGFRNONAA >60.0     No results found for this or any previous visit (from the past 24 hour(s)).   Microbiology Results (last 7 days)     Procedure Component Value Units Date/Time    Blood culture x two cultures. Draw prior to antibiotics. [[id number]] Collected:  11/07/19 1958     Order Status:  Completed Specimen:  Blood from Peripheral, Antecubital, Right Updated:  11/09/19 2022     Blood Culture, Routine No Growth to date      No Growth to date      No Growth to date    Narrative:       Aerobic and anaerobic    Blood culture x two cultures. Draw prior to antibiotics. [[id number]] Collected:  11/1957    Order Status:  Completed Specimen:  Blood from Peripheral, Antecubital, Left Updated:  11/09/19 2022     Blood Culture, Routine No Growth to date      No Growth to date      No Growth to date    Narrative:       Aerobic and anaerobic    Vaginosis Screen by DNA Probe [[id number]] Collected:  11/08/19 1810    Order Status:  No result Updated:  11/08/19 2154    Respiratory Infection Panel, Nasopharyngeal [[id number]] Collected:  11/08/19 1100    Order Status:  Completed Specimen:  Nasopharyngeal Swab Updated:  11/08/19 1742     Respiratory Infection Panel Source NP Swab     Adenovirus Not Detected     Coronavirus 229E Not Detected     Coronavirus HKU1 Not Detected     Coronavirus NL63 Not Detected     Coronavirus OC43 Not Detected     Human Metapneumovirus Not Detected     Human Rhinovirus/Enterovirus Not Detected     Influenza A (subtypes H1, H1-2009,H3) Not Detected     Influenza B Not Detected     Parainfluenza Virus 1 Not Detected     Parainfluenza Virus 2 Not Detected     Parainfluenza Virus 3 Not Detected     Parainfluenza Virus 4 Not Detected     Respiratory Syncytial Virus Not Detected     Bordetella Parapertussis (TT7111) Not Detected     Bordetella pertussis (ptxP) Not Detected     Chlamydia pneumoniae Not Detected     Mycoplasma pneumoniae Not Detected    Narrative:       For all other respiratory sources order [id number] Respiratory  Viral Panel by PCR (RVPCR)    Culture, Respiratory with Gram Stain [[id number]]     Order Status:  Canceled Specimen:  Respiratory     Culture, Respiratory with Gram Stain [[id number]]     Order Status:  No result Specimen:  Respiratory     Influenza A  & B by Molecular [[id number]] Collected:  11/07/19 2000    Order Status:  Sent Specimen:  Nasopharyngeal Swab Updated:  11/07/19 2136        Imaging Results          X-Ray Chest AP Portable (Final result)  Result time 11/07/19 20:48:09    Final result by Matti Lord MD (11/07/19 20:48:09)                 Impression:      Pulmonary emphysema without detrimental change or radiographic acute intrathoracic process seen.  Specifically, no focal consolidation.      Electronically signed by: Matti Lord MD  Date:    11/07/2019  Time:    20:48             Narrative:    EXAMINATION:  XR CHEST AP PORTABLE    CLINICAL HISTORY:  Sepsis;    TECHNIQUE:  Single frontal view of the chest was performed.    COMPARISON:  Chest radiograph 10/20/2019    FINDINGS:  Monitoring leads and tubing overlie the chest.  Patient is slightly rotated.  No detrimental change.    Cardiomediastinal silhouette is midline and within normal limits.  The lungs are symmetrically hyperexpanded with mild diffuse nonspecific interstitial coarsening suggesting sequela of underlying pulmonary emphysema similar to prior.  No large consolidation or new focal opacity definitively seen.  No pleural effusion or pneumothorax.  Dextroscoliosis of the thoracic spine similar to prior.  No acute osseous process seen.                              ECHO 2/2018:  CONCLUSIONS     1 - Normal left ventricular systolic function (EF 60-65%).     2 - Indeterminate LV diastolic function.     3 - Right atrial enlargement.     4 - Right ventricular enlargement with hypertrophy,        with mildly depressed systolic function.     5 - Pulmonary hypertension.        The estimated PA systolic pressure is 67 mmHg.     6 - Moderate tricuspid regurgitation.     7 - Small pericardial effusion.     8 - Increased central venous pressure.       Assessment/Plan:      * Acute on chronic respiratory failure with hypoxia and hypercapnia  Patient presenting with hypercapnic respiratory failure  likely 2/2 to COPD exacerbation likely due to medication non-adherence. Daughter stated patient hasn't been keeping up with her maintenance therapy.     CXR demonstrated Pulmonary emphysema without detrimental change or radiographic acute intrathoracic process seen.  Specifically, no focal consolidation.    On presentation, VBG initially pH 7.264 with CO2 114 and HCO3 52. 7.344/88/48 on BiPAP.  ABG on room air -pH 7.4, CO2- 62, O2 sat- 78,   Resp infection panel- negative    Plan:  -BiPAP qHS  -Wean O2 as tolerated, with O2 goals of 88-92%.   - Continue azithro x5days  - Continue steroids x5 days  -Will require BiPAP qHS at home given her severe COPD.   -Updated her daughter with the plan of treatment and importance of BiPAP at home.      Pulmonary hypertension  PA pressure of 67, severe  -Will repeat echo      (HFpEF) heart failure with preserved ejection fraction  Last EF 60% in 2018    Does not require GDMT give dd  -Will restart lasix and get ECHO as it has been 1.5 years and has severe pHTN      Essential hypertension  Holding Home meds: carvedilol 3.125 bid, lasix 40 po, losartan 100  -BP stable, continue to hold correg and losartan- patient may not require when she is d/c'd SBP (100-120's)  -Will restart lasix      Depression  -Continue home lexapro 20 mg, will consider adding mirtazapine       COPD with acute exacerbation  See acute hypercapnic resp failure        VTE Risk Mitigation (From admission, onward)         Ordered     enoxaparin injection 30 mg  Daily      11/08/19 0928     Place sequential compression device  Until discontinued      11/08/19 0300     IP VTE HIGH RISK PATIENT  Once      11/08/19 0300                      Jose Padilla MD  Department of Hospital Medicine   [facility]

## 2019-11-10 NOTE — PLAN OF CARE
Problem: Adult Inpatient Plan of Care  Goal: Plan of Care Review  Outcome: Ongoing, Progressing     Problem: Adult Inpatient Plan of Care  Goal: Absence of Hospital-Acquired Illness or Injury  Outcome: Ongoing, Progressing   No acute events throughout night. Pt AAO X 4; able to express needs.  Analgesic given as ordered.   Sleeping pill administered.  Effective.  Tolerated BiPap from 2230-5:30am    Safety maintained.  Bed in low position,  call  light in reach.    Will continue to monitor

## 2019-11-10 NOTE — PROGRESS NOTES
Withdrawn mood observed. Continues CPT therapy. 2 LPM in progress via nc. O2 sats maintained > 92%. No distress noted.

## 2019-11-10 NOTE — PHARMACY MED REC
"Admission Medication Reconciliation - Pharmacy Consult Note    The home medication history was taken by Bette Howe.  Based on information gathered and subsequent review by the clinical pharmacist, the items below may need attention.     You may go to "Admission" then "Reconcile Home Medications" tabs to review and/or act upon these items.     Potentially problematic discrepancies with current MAR  o Patient IS taking the following which was not ordered upon admit  o Carvedilol 3.125mg BID  o Losartan 50mg qdaily    Potential issues to be addressed PRIOR TO DISCHARGE  o Please evaluate when appropriate to resume anti-hypertensive regimen. Patient is currently normotensive, would consider resuming losartan at decreased dose of 25mg daily if concerned for hypotension.     Please address this information as you see fit.  Feel free to contact us if you have any questions or require assistance.    Arnold Arauz, PharmD  [phone]                  .    .            "

## 2019-11-10 NOTE — ASSESSMENT & PLAN NOTE
Last EF 60% in 2018    Does not require GDMT give dd  -Will restart lasix and get ECHO as it has been 1.5 years and has severe pHTN

## 2019-11-10 NOTE — ASSESSMENT & PLAN NOTE
Holding Home meds: carvedilol 3.125 bid, lasix 40 po, losartan 100  -BP stable, continue to hold correg and losartan- patient may not require when she is d/c'd SBP (100-120's)  -Will restart lasix

## 2019-11-11 PROBLEM — Z99.81 DEPENDENCE ON CONTINUOUS SUPPLEMENTAL OXYGEN: Status: ACTIVE | Noted: 2019-11-11

## 2019-11-11 PROBLEM — E44.0 MODERATE PROTEIN-CALORIE MALNUTRITION: Status: ACTIVE | Noted: 2019-11-11

## 2019-11-11 LAB
ANION GAP SERPL CALC-SCNC: 8 MMOL/L (ref 8–16)
ASCENDING AORTA: 2.54 CM
AV INDEX (PROSTH): 0.95
AV MEAN GRADIENT: 6 MMHG
AV PEAK GRADIENT: 13 MMHG
AV VALVE AREA: 2.94 CM2
AV VELOCITY RATIO: 0.84
BACTERIAL VAGINOSIS DNA: NEGATIVE
BASOPHILS # BLD AUTO: 0.01 K/UL (ref 0–0.2)
BASOPHILS NFR BLD: 0.2 % (ref 0–1.9)
BSA FOR ECHO PROCEDURE: 1.38 M2
BUN SERPL-MCNC: 17 MG/DL (ref 8–23)
CALCIUM SERPL-MCNC: 9.1 MG/DL (ref 8.7–10.5)
CANDIDA GLABRATA DNA: NEGATIVE
CANDIDA KRUSEI DNA: NEGATIVE
CANDIDA RRNA VAG QL PROBE: NEGATIVE
CHLORIDE SERPL-SCNC: 93 MMOL/L (ref 95–110)
CO2 SERPL-SCNC: 39 MMOL/L (ref 23–29)
CREAT SERPL-MCNC: 0.8 MG/DL (ref 0.5–1.4)
CV ECHO LV RWT: 0.31 CM
DIFFERENTIAL METHOD: ABNORMAL
DOP CALC AO PEAK VEL: 1.8 M/S
DOP CALC AO VTI: 29.96 CM
DOP CALC LVOT AREA: 3.1 CM2
DOP CALC LVOT DIAMETER: 1.99 CM
DOP CALC LVOT PEAK VEL: 1.52 M/S
DOP CALC LVOT STROKE VOLUME: 88.19 CM3
DOP CALCLVOT PEAK VEL VTI: 28.37 CM
E WAVE DECELERATION TIME: 191.95 MSEC
E/A RATIO: 0.92
E/E' RATIO: 8.78 M/S
ECHO LV POSTERIOR WALL: 0.56 CM (ref 0.6–1.1)
EOSINOPHIL # BLD AUTO: 0.1 K/UL (ref 0–0.5)
EOSINOPHIL NFR BLD: 0.8 % (ref 0–8)
ERYTHROCYTE [DISTWIDTH] IN BLOOD BY AUTOMATED COUNT: 12.5 % (ref 11.5–14.5)
EST. GFR  (AFRICAN AMERICAN): >60 ML/MIN/1.73 M^2
EST. GFR  (NON AFRICAN AMERICAN): >60 ML/MIN/1.73 M^2
FOLATE SERPL-MCNC: 7.6 NG/ML (ref 4–24)
FRACTIONAL SHORTENING: 35 % (ref 28–44)
GLUCOSE SERPL-MCNC: 93 MG/DL (ref 70–110)
HCT VFR BLD AUTO: 35.7 % (ref 37–48.5)
HGB BLD-MCNC: 11.3 G/DL (ref 12–16)
IMM GRANULOCYTES # BLD AUTO: 0.01 K/UL (ref 0–0.04)
IMM GRANULOCYTES NFR BLD AUTO: 0.2 % (ref 0–0.5)
INTERVENTRICULAR SEPTUM: 0.54 CM (ref 0.6–1.1)
IVRT: 0.09 MSEC
LA MAJOR: 4.44 CM
LA MINOR: 4.48 CM
LA WIDTH: 3.72 CM
LEFT ATRIUM SIZE: 2.83 CM
LEFT ATRIUM VOLUME INDEX: 28.7 ML/M2
LEFT ATRIUM VOLUME: 39.91 CM3
LEFT INTERNAL DIMENSION IN SYSTOLE: 2.38 CM (ref 2.1–4)
LEFT VENTRICLE DIASTOLIC VOLUME INDEX: 40.17 ML/M2
LEFT VENTRICLE DIASTOLIC VOLUME: 55.79 ML
LEFT VENTRICLE MASS INDEX: 35 G/M2
LEFT VENTRICLE SYSTOLIC VOLUME INDEX: 14.2 ML/M2
LEFT VENTRICLE SYSTOLIC VOLUME: 19.69 ML
LEFT VENTRICULAR INTERNAL DIMENSION IN DIASTOLE: 3.64 CM (ref 3.5–6)
LEFT VENTRICULAR MASS: 49.08 G
LV LATERAL E/E' RATIO: 8.78 M/S
LV SEPTAL E/E' RATIO: 8.78 M/S
LYMPHOCYTES # BLD AUTO: 1.7 K/UL (ref 1–4.8)
LYMPHOCYTES NFR BLD: 26.4 % (ref 18–48)
MAGNESIUM SERPL-MCNC: 1.9 MG/DL (ref 1.6–2.6)
MCH RBC QN AUTO: 31.8 PG (ref 27–31)
MCHC RBC AUTO-ENTMCNC: 31.7 G/DL (ref 32–36)
MCV RBC AUTO: 101 FL (ref 82–98)
MONOCYTES # BLD AUTO: 0.5 K/UL (ref 0.3–1)
MONOCYTES NFR BLD: 6.8 % (ref 4–15)
MV PEAK A VEL: 0.86 M/S
MV PEAK E VEL: 0.79 M/S
NEUTROPHILS # BLD AUTO: 4.3 K/UL (ref 1.8–7.7)
NEUTROPHILS NFR BLD: 65.6 % (ref 38–73)
NRBC BLD-RTO: 0 /100 WBC
PHOSPHATE SERPL-MCNC: 3.8 MG/DL (ref 2.7–4.5)
PISA TR MAX VEL: 2.71 M/S
PLATELET # BLD AUTO: 165 K/UL (ref 150–350)
PMV BLD AUTO: 10.2 FL (ref 9.2–12.9)
POTASSIUM SERPL-SCNC: 3.9 MMOL/L (ref 3.5–5.1)
PULM VEIN S/D RATIO: 1.22
PV PEAK D VEL: 0.55 M/S
PV PEAK S VEL: 0.67 M/S
RA MAJOR: 3.74 CM
RA PRESSURE: 3 MMHG
RA WIDTH: 3 CM
RBC # BLD AUTO: 3.55 M/UL (ref 4–5.4)
RIGHT VENTRICULAR END-DIASTOLIC DIMENSION: 2.44 CM
RV TISSUE DOPPLER FREE WALL SYSTOLIC VELOCITY 1 (APICAL 4 CHAMBER VIEW): 10.65 CM/S
SINUS: 2.33 CM
SODIUM SERPL-SCNC: 140 MMOL/L (ref 136–145)
STJ: 2.09 CM
T VAGINALIS RRNA GENITAL QL PROBE: NEGATIVE
TDI LATERAL: 0.09 M/S
TDI SEPTAL: 0.09 M/S
TDI: 0.09 M/S
TR MAX PG: 29 MMHG
TRICUSPID ANNULAR PLANE SYSTOLIC EXCURSION: 1.72 CM
TV REST PULMONARY ARTERY PRESSURE: 32 MMHG
VIT B12 SERPL-MCNC: 642 PG/ML (ref 210–950)
WBC # BLD AUTO: 6.58 K/UL (ref 3.9–12.7)

## 2019-11-11 PROCEDURE — 36415 COLL VENOUS BLD VENIPUNCTURE: CPT

## 2019-11-11 PROCEDURE — 97535 SELF CARE MNGMENT TRAINING: CPT

## 2019-11-11 PROCEDURE — 84100 ASSAY OF PHOSPHORUS: CPT

## 2019-11-11 PROCEDURE — 99232 SBSQ HOSP IP/OBS MODERATE 35: CPT | Mod: ,,, | Performed by: HOSPITALIST

## 2019-11-11 PROCEDURE — 25000003 PHARM REV CODE 250: Performed by: STUDENT IN AN ORGANIZED HEALTH CARE EDUCATION/TRAINING PROGRAM

## 2019-11-11 PROCEDURE — 27000221 HC OXYGEN, UP TO 24 HOURS

## 2019-11-11 PROCEDURE — 25000242 PHARM REV CODE 250 ALT 637 W/ HCPCS: Performed by: STUDENT IN AN ORGANIZED HEALTH CARE EDUCATION/TRAINING PROGRAM

## 2019-11-11 PROCEDURE — 94660 CPAP INITIATION&MGMT: CPT

## 2019-11-11 PROCEDURE — 83735 ASSAY OF MAGNESIUM: CPT

## 2019-11-11 PROCEDURE — 85025 COMPLETE CBC W/AUTO DIFF WBC: CPT

## 2019-11-11 PROCEDURE — 94640 AIRWAY INHALATION TREATMENT: CPT

## 2019-11-11 PROCEDURE — 11000001 HC ACUTE MED/SURG PRIVATE ROOM

## 2019-11-11 PROCEDURE — 94664 DEMO&/EVAL PT USE INHALER: CPT

## 2019-11-11 PROCEDURE — 82607 VITAMIN B-12: CPT

## 2019-11-11 PROCEDURE — 99232 PR SUBSEQUENT HOSPITAL CARE,LEVL II: ICD-10-PCS | Mod: ,,, | Performed by: HOSPITALIST

## 2019-11-11 PROCEDURE — 94761 N-INVAS EAR/PLS OXIMETRY MLT: CPT

## 2019-11-11 PROCEDURE — 80048 BASIC METABOLIC PNL TOTAL CA: CPT

## 2019-11-11 PROCEDURE — 99900035 HC TECH TIME PER 15 MIN (STAT)

## 2019-11-11 PROCEDURE — 82746 ASSAY OF FOLIC ACID SERUM: CPT

## 2019-11-11 PROCEDURE — 63600175 PHARM REV CODE 636 W HCPCS: Performed by: STUDENT IN AN ORGANIZED HEALTH CARE EDUCATION/TRAINING PROGRAM

## 2019-11-11 RX ORDER — PREDNISONE 20 MG/1
40 TABLET ORAL DAILY
Qty: 2 TABLET | Refills: 0 | Status: SHIPPED | OUTPATIENT
Start: 2019-11-12 | End: 2019-11-12 | Stop reason: HOSPADM

## 2019-11-11 RX ORDER — GUAIFENESIN 600 MG/1
600 TABLET, EXTENDED RELEASE ORAL 2 TIMES DAILY
Qty: 20 TABLET | Refills: 0 | COMMUNITY
Start: 2019-11-11 | End: 2019-11-21

## 2019-11-11 RX ORDER — AZITHROMYCIN 500 MG/1
500 TABLET, FILM COATED ORAL NIGHTLY
Qty: 2 TABLET | Refills: 0 | Status: SHIPPED | OUTPATIENT
Start: 2019-11-11 | End: 2019-11-12 | Stop reason: HOSPADM

## 2019-11-11 RX ADMIN — ACETAMINOPHEN 325 MG: 325 TABLET ORAL at 08:11

## 2019-11-11 RX ADMIN — ENOXAPARIN SODIUM 30 MG: 100 INJECTION SUBCUTANEOUS at 04:11

## 2019-11-11 RX ADMIN — SENNOSIDES AND DOCUSATE SODIUM 1 TABLET: 8.6; 5 TABLET ORAL at 08:11

## 2019-11-11 RX ADMIN — ACETAMINOPHEN 325 MG: 325 TABLET ORAL at 02:11

## 2019-11-11 RX ADMIN — FLUTICASONE FUROATE AND VILANTEROL TRIFENATATE 1 PUFF: 100; 25 POWDER RESPIRATORY (INHALATION) at 08:11

## 2019-11-11 RX ADMIN — AZITHROMYCIN 500 MG: 250 TABLET, FILM COATED ORAL at 09:11

## 2019-11-11 RX ADMIN — GUAIFENESIN 600 MG: 600 TABLET, EXTENDED RELEASE ORAL at 09:11

## 2019-11-11 RX ADMIN — IPRATROPIUM BROMIDE AND ALBUTEROL SULFATE 3 ML: .5; 3 SOLUTION RESPIRATORY (INHALATION) at 07:11

## 2019-11-11 RX ADMIN — RAMELTEON 8 MG: 8 TABLET ORAL at 09:11

## 2019-11-11 RX ADMIN — PREDNISONE 40 MG: 10 TABLET ORAL at 08:11

## 2019-11-11 RX ADMIN — FUROSEMIDE 40 MG: 40 TABLET ORAL at 08:11

## 2019-11-11 RX ADMIN — GUAIFENESIN 600 MG: 600 TABLET, EXTENDED RELEASE ORAL at 08:11

## 2019-11-11 RX ADMIN — ESCITALOPRAM OXALATE 20 MG: 20 TABLET ORAL at 08:11

## 2019-11-11 RX ADMIN — TIOTROPIUM BROMIDE 18 MCG: 18 CAPSULE ORAL; RESPIRATORY (INHALATION) at 08:11

## 2019-11-11 RX ADMIN — SENNOSIDES AND DOCUSATE SODIUM 1 TABLET: 8.6; 5 TABLET ORAL at 09:11

## 2019-11-11 RX ADMIN — IPRATROPIUM BROMIDE AND ALBUTEROL SULFATE 3 ML: .5; 3 SOLUTION RESPIRATORY (INHALATION) at 01:11

## 2019-11-11 RX ADMIN — IPRATROPIUM BROMIDE AND ALBUTEROL SULFATE 3 ML: .5; 3 SOLUTION RESPIRATORY (INHALATION) at 08:11

## 2019-11-11 NOTE — SUBJECTIVE & OBJECTIVE
Interval History: NAEO. Appears clinically improved. Tolerated BiPAP overnight.     Review of Systems   Constitutional: Negative for activity change, chills, diaphoresis and fever.   HENT: Negative for rhinorrhea, sneezing, sore throat and trouble swallowing.    Eyes: Negative for pain and visual disturbance.   Respiratory: Positive for cough. Negative for choking, chest tightness and shortness of breath.    Cardiovascular: Negative for chest pain, palpitations and leg swelling.   Gastrointestinal: Negative for abdominal distention, abdominal pain, blood in stool, constipation, diarrhea, nausea and vomiting.   Endocrine: Negative for cold intolerance, heat intolerance and polyuria.   Genitourinary: Negative for difficulty urinating, dysuria, flank pain, hematuria and vaginal discharge.   Musculoskeletal: Negative for neck pain and neck stiffness.   Skin: Negative for rash and wound.   Allergic/Immunologic: Negative for immunocompromised state.   Neurological: Positive for headaches. Negative for dizziness, facial asymmetry and light-headedness.   Psychiatric/Behavioral: Negative for agitation, behavioral problems, confusion and decreased concentration.     Objective:     Vital Signs (Most Recent):  Temp: 97.7 °F (36.5 °C) (11/11/19 1154)  Pulse: 106 (11/11/19 1522)  Resp: 18 (11/11/19 1319)  BP: 121/67 (11/11/19 1154)  SpO2: 99 % (11/11/19 1319) Vital Signs (24h Range):  Temp:  [97.7 °F (36.5 °C)-98.3 °F (36.8 °C)] 97.7 °F (36.5 °C)  Pulse:  [] 106  Resp:  [15-18] 18  SpO2:  [98 %-100 %] 99 %  BP: (111-131)/(63-80) 121/67     Weight: 44 kg (97 lb)  Body mass index is 18.33 kg/m².    Intake/Output Summary (Last 24 hours) at 11/11/2019 1633  Last data filed at 11/11/2019 1315  Gross per 24 hour   Intake 480 ml   Output 500 ml   Net -20 ml      Physical Exam   Constitutional: She is oriented to person, place, and time. She appears well-developed and well-nourished. No distress.   HENT:   Head: Normocephalic and  atraumatic.   Eyes: Conjunctivae and EOM are normal. No scleral icterus.   Neck: Normal range of motion. No JVD present.   Cardiovascular: Normal rate, regular rhythm, normal heart sounds and intact distal pulses. Exam reveals no friction rub.   No murmur heard.  Pulmonary/Chest: Effort normal and breath sounds normal. No stridor. No respiratory distress. She has no wheezes. She has no rales.   Abdominal: Soft. She exhibits no distension. There is tenderness (mild tenderness in bladder region). There is no rebound and no guarding.   Musculoskeletal: Normal range of motion. She exhibits no edema.   Neurological: She is alert and oriented to person, place, and time. No cranial nerve deficit.   Skin: Skin is warm and dry. She is not diaphoretic.   Psychiatric:   Withdrawn   Nursing note and vitals reviewed.      Significant Labs:   BMP:   Recent Labs   Lab 11/11/19  0702   GLU 93      K 3.9   CL 93*   CO2 39*   BUN 17   CREATININE 0.8   CALCIUM 9.1   MG 1.9     CBC:   Recent Labs   Lab 11/10/19  0847 11/11/19  0702   WBC 5.86 6.58   HGB 10.3* 11.3*   HCT 33.1* 35.7*    165     CMP:   Recent Labs   Lab 11/10/19  0847 11/11/19  0702    140   K 4.2 3.9   CL 96 93*   CO2 39* 39*   GLU 93 93   BUN 17 17   CREATININE 0.8 0.8   CALCIUM 8.9 9.1   PROT 5.1*  --    ALBUMIN 2.7*  --    BILITOT 0.3  --    ALKPHOS 46*  --    AST 13  --    ALT 12  --    ANIONGAP 4* 8   EGFRNONAA >60.0 >60.0       Significant Imaging: I have reviewed all pertinent imaging results/findings within the past 24 hours.

## 2019-11-11 NOTE — PROGRESS NOTES
[facility] Hospital Medicine  Progress Note    Patient Name: Sonya Buckner  MRN: [id number]  Patient Class: IP- Inpatient   Admission Date: 11/7/2019  Length of Stay: 4 days  Attending Physician: Cam Brian MD  Primary Care Provider: Will Monaco MD    [facility] Medicine Team: [facility] HOSP MED 1 Delfina Juarez MD    Subjective:     Principal Problem:Acute on chronic respiratory failure with hypoxia and hypercapnia        HPI:  Ms Heredia is a 63 yo F with COPD, HFpEF (EF 60% 2018), asthma, and htn who presented to the ED on 11/8 in acute respiratory distress. Family is present with patient and states that she has been increasingly altered over the past 3 weeks but that it has been more pronounced today. They report that the patient is conversational at baseline but has been more combative and altered lately. She is on 2-3L NC at home but report that she desaturated to the 60s on 2L at home which prompted them to bring her to the hospital. Of note, patient presented to [place] one week ago with similar symptoms. Patient is also complaining of RLQ abdominal pain that has been present over the same timeline as well.    On examination in ED, patient was lethargic but alert and oriented. She was on CPAP satting 99% in ED and then on BiPaP. Patient denies any fever, chills, dyspnea, BV, dysuria, hematuria. Patient is a current and long time smoker, down to 1PPW. Family denies any drinking or drug use other than remote marijuana use.        Overview/Hospital Course:  Patient was admitted for acute on chronic hypoxic hypercapnic resp failure 2/2 to COPD exacerbation in the setting of medication non-compliance. Received IV solumedrol and placed on BiPAP in ED. She was  treated with duonebs q4hrs, PO steroids, BiPAP qHS and azithromycin. Respiratory infection panel was negative. Given her ABG on room air- CO2- 62, pH 7.4, O2 saturation 78, patient will require BiPAP nightly at home. Performed Abd US  to evaluate for abdominal pain which was unrevealing for hernia or abnormality. Wet prep for vaginal irritation and discharge pending. Patient lives with her daughter who has been updated with the treatment plan. For Pulm HTN WHO class 3- repeat TTE shows PA systolic pressure 32, which has improved from 62 on 02/2018. Patient appeared withdrawn and depressed throughout hospitalization. Will recommend treatment with mirtazapine and close follow up outpatient. Encouraged daughter to assist patient with medication adherence and BiPAP use at night. Patient is medically stable for discharge and will be discharged with a 1 day dose of azithromycin and prednisone, to complete 5 day treatment course. Awaiting BiPAP [facility] authorization for patient, to follow up with sleep study outpatient.       Interval History: NAEO. Appears clinically improved. Tolerated BiPAP overnight.     Review of Systems   Constitutional: Negative for activity change, chills, diaphoresis and fever.   HENT: Negative for rhinorrhea, sneezing, sore throat and trouble swallowing.    Eyes: Negative for pain and visual disturbance.   Respiratory: Positive for cough. Negative for choking, chest tightness and shortness of breath.    Cardiovascular: Negative for chest pain, palpitations and leg swelling.   Gastrointestinal: Negative for abdominal distention, abdominal pain, blood in stool, constipation, diarrhea, nausea and vomiting.   Endocrine: Negative for cold intolerance, heat intolerance and polyuria.   Genitourinary: Negative for difficulty urinating, dysuria, flank pain, hematuria and vaginal discharge.   Musculoskeletal: Negative for neck pain and neck stiffness.   Skin: Negative for rash and wound.   Allergic/Immunologic: Negative for immunocompromised state.   Neurological: Positive for headaches. Negative for dizziness, facial asymmetry and light-headedness.   Psychiatric/Behavioral: Negative for agitation, behavioral problems, confusion and  decreased concentration.     Objective:     Vital Signs (Most Recent):  Temp: 97.7 °F (36.5 °C) (11/11/19 1154)  Pulse: 106 (11/11/19 1522)  Resp: 18 (11/11/19 1319)  BP: 121/67 (11/11/19 1154)  SpO2: 99 % (11/11/19 1319) Vital Signs (24h Range):  Temp:  [97.7 °F (36.5 °C)-98.3 °F (36.8 °C)] 97.7 °F (36.5 °C)  Pulse:  [] 106  Resp:  [15-18] 18  SpO2:  [98 %-100 %] 99 %  BP: (111-131)/(63-80) 121/67     Weight: 44 kg (97 lb)  Body mass index is 18.33 kg/m².    Intake/Output Summary (Last 24 hours) at 11/11/2019 1633  Last data filed at 11/11/2019 1315  Gross per 24 hour   Intake 480 ml   Output 500 ml   Net -20 ml      Physical Exam   Constitutional: She is oriented to person, place, and time. She appears well-developed and well-nourished. No distress.   HENT:   Head: Normocephalic and atraumatic.   Eyes: Conjunctivae and EOM are normal. No scleral icterus.   Neck: Normal range of motion. No JVD present.   Cardiovascular: Normal rate, regular rhythm, normal heart sounds and intact distal pulses. Exam reveals no friction rub.   No murmur heard.  Pulmonary/Chest: Effort normal and breath sounds normal. No stridor. No respiratory distress. She has no wheezes. She has no rales.   Abdominal: Soft. She exhibits no distension. There is tenderness (mild tenderness in bladder region). There is no rebound and no guarding.   Musculoskeletal: Normal range of motion. She exhibits no edema.   Neurological: She is alert and oriented to person, place, and time. No cranial nerve deficit.   Skin: Skin is warm and dry. She is not diaphoretic.   Psychiatric:   Withdrawn   Nursing note and vitals reviewed.      Significant Labs:   BMP:   Recent Labs   Lab 11/11/19  0702   GLU 93      K 3.9   CL 93*   CO2 39*   BUN 17   CREATININE 0.8   CALCIUM 9.1   MG 1.9     CBC:   Recent Labs   Lab 11/10/19  0847 11/11/19  0702   WBC 5.86 6.58   HGB 10.3* 11.3*   HCT 33.1* 35.7*    165     CMP:   Recent Labs   Lab 11/10/19  0847  11/11/19  0702    140   K 4.2 3.9   CL 96 93*   CO2 39* 39*   GLU 93 93   BUN 17 17   CREATININE 0.8 0.8   CALCIUM 8.9 9.1   PROT 5.1*  --    ALBUMIN 2.7*  --    BILITOT 0.3  --    ALKPHOS 46*  --    AST 13  --    ALT 12  --    ANIONGAP 4* 8   EGFRNONAA >60.0 >60.0       Significant Imaging: I have reviewed all pertinent imaging results/findings within the past 24 hours.      Assessment/Plan:      * Acute on chronic respiratory failure with hypoxia and hypercapnia  Patient presenting with hypercapnic respiratory failure likely 2/2 to COPD exacerbation likely due to medication non-adherence. Daughter stated patient hasn't been keeping up with her maintenance therapy.     CXR demonstrated Pulmonary emphysema without detrimental change or radiographic acute intrathoracic process seen.  Specifically, no focal consolidation.    On presentation, VBG initially pH 7.264 with CO2 114 and HCO3 52. 7.344/88/48 on BiPAP.  ABG on room air -pH 7.4, CO2- 62, O2 sat- 78,   Resp infection panel- negative    Plan:  -Medically ready for discharge. Awaiting authorization for home BiPAP prior to discharge  -BiPAP qHS  -Wean O2 as tolerated, with O2 goals of 88-92%.   - Continue azithro x5days  - Continue steroids x5 days  -Will require BiPAP qHS at home given her severe COPD.   -Updated her daughter with the plan of treatment and importance of BiPAP at home.      Depression  -Continue home lexapro 20 mg      Pulmonary hypertension, WHO Class III  PA pressure of 67, severe  -Will repeat echo      Essential hypertension  Holding Home meds: carvedilol 3.125 bid, lasix 40 po, losartan 100  -BP stable, continue to hold correg and losartan- patient may not require when she is d/c'd SBP (100-120's)  -Will restart lasix      COPD with acute exacerbation  See acute hypercapnic resp failure        VTE Risk Mitigation (From admission, onward)         Ordered     enoxaparin injection 30 mg  Daily      11/08/19 0928     Place sequential  compression device  Until discontinued      11/08/19 0300     IP VTE HIGH RISK PATIENT  Once      11/08/19 0300                      Delfina Juarez MD  Department of Hospital Medicine   [facility]

## 2019-11-11 NOTE — PLAN OF CARE
Problem: Fall Injury Risk  Goal: Absence of Fall and Fall-Related Injury  Outcome: Ongoing, Progressing  Intervention: Identify and Manage Contributors to Fall Injury Risk  Flowsheets (Taken 11/11/2019 1600)  Self-Care Promotion: independence encouraged;meal setup provided;safe use of adaptive equipment encouraged  Medication Review/Management: medications reviewed;dosing adjusted    Problem: Adult Inpatient Plan of Care  Goal: Plan of Care Review  Outcome: Ongoing, Progressing  Goal: Patient-Specific Goal (Individualization)  Outcome: Ongoing, Progressing  Goal: Absence of Hospital-Acquired Illness or Injury  Outcome: Ongoing, Progressing  Goal: Optimal Comfort and Wellbeing  Outcome: Ongoing, Progressing  Intervention: Provide Person-Centered Care  Flowsheets (Taken 11/11/2019 1600)  Trust Relationship/Rapport: care explained;choices provided  Goal: Readiness for Transition of Care  Outcome: Ongoing, Progressing  Goal: Rounds/Family Conference  Outcome: Ongoing, Progressing     Pt awake and alert, withdrawn for the most part of the day. More verbal this afternoon. Son at bedside. PT and OT as ordered. 6 min walk test performed per OT. Pt c/o headache, relieved with prn tyl. Awaiting insurance approval for bipap at home. Daughter and son appear to not be getting along, daughter stated she was taking her mom home and son also stated he wanted her home with him. Pt tearful when asked her feelings. This nurse verbalized concern to CM and SW.   Bed remains low and locked and call light in reach. Wctm.

## 2019-11-11 NOTE — PT/OT/SLP PROGRESS
Occupational Therapy   Treatment    Name: Sonya Buckner  MRN: [id number]  Admitting Diagnosis:  Acute on chronic respiratory failure with hypoxia and hypercapnia       Recommendations:     Discharge Recommendations: home with home health  Discharge Equipment Recommendations:  none  Barriers to discharge:  None    Assessment:     Sonya Buckner is a 64 y.o. female with a medical diagnosis of Acute on chronic respiratory failure with hypoxia and hypercapnia.  She presents with poor O2 saturation during activity which limits her occupational performance. Performance deficits affecting function are weakness, impaired endurance, impaired self care skills, impaired functional mobilty, gait instability, decreased lower extremity function, pain, decreased ROM, impaired cardiopulmonary response to activity, decreased safety awareness.     Rehab Prognosis:  Fair; patient would benefit from acute skilled OT services to address these deficits and reach maximum level of function.       Plan:     Patient to be seen 3 x/week to address the above listed problems via self-care/home management, therapeutic activities, therapeutic exercises  · Plan of Care Expires: 12/08/19  · Plan of Care Reviewed with: patient, family    Subjective     Pain/Comfort:  · Pain Rating 1: 0/10  · Pain Rating Post-Intervention 1: 0/10    Objective:     Communicated with: RN prior to session.  Patient found HOB elevated with CPAP, oxygen, peripheral IV upon OT entry to room.    General Precautions: Standard, fall, respiratory   Orthopedic Precautions:N/A   Braces: N/A     Occupational Performance:     Bed Mobility:    · Patient completed Rolling/Turning to Left with  independence  · Patient completed Rolling/Turning to Right with independence  · Patient completed Scooting/Bridging with independence  · Patient completed Supine to Sit with independence  · Patient completed Sit to Supine with independence     Functional  Mobility/Transfers:  · Patient completed Sit <> Stand Transfer with contact guard assistance  with  hand-held assist, rolling walker and off room O2 (2L via NC)   · Functional Mobility: Able to walk short household distances prior to O2 sat dropping to 84%.  O2 sat improved to 94% when pt was coached on purse-lipped breathing technique and reminded during walk back and forth from the door to the chair, back to the door then to the bed w/ frequent rest breaks.     Activities of Daily Living:  · Upper Body Dressing: supervision seated at EOB to don tank top.  · Lower Body Dressing: supervision seated at EOB to don tights and hospital socks.       [facility] 6 Click ADL: 20    Treatment & Education:  -Pt edu on OT role/POC  -Importance of OOB activity with staff assistance  -Safety during functional t/f and mobility  - White board updated  - Multiple self care tasks/functional mobility completed-- assistance level noted above  - All questions/concerns answered within OT scope of practice  Pt was giving a spirometer and ask to try using it x3 per day prior to meals to improve the volume of inspiration, diaphragm muscles and focus on a complete emptying of the lungs.     Patient left seated at EOB with call button in reach, RN notified and son presentEducation:      GOALS:   Multidisciplinary Problems     Occupational Therapy Goals        Problem: Occupational Therapy Goal    Goal Priority Disciplines Outcome Interventions   Occupational Therapy Goal     OT, PT/OT Ongoing, Progressing    Description:  Goals to be met by: 11/6/2019     Patient will increase functional independence with ADLs by performing:    UE Dressing with [place].  LE Dressing with [place].  Grooming while standing at sink with Modified [place].  Toileting from toilet with Modified [place] for hygiene and clothing management.   Bathing from standing at sink with Modified [place].  Toilet transfer to toilet with Modified  [place].                       Time Tracking:     OT Date of Treatment: 11/11/19  OT Start Time: 1443  OT Stop Time: 1515  OT Total Time (min): 32 min    Billable Minutes:Self Care/Home Management 32 mins    Ajay Mahan, OT  11/11/2019

## 2019-11-11 NOTE — PLAN OF CARE
[facility]    HOME HEALTH ORDERS  FACE TO FACE ENCOUNTER    Patient Name: Sonya Buckner  YOB: 1955    PCP: Will Monaco MD   PCP Address: [address] [place] [address] / [place] [address]  PCP Phone Number: [phone]  PCP Fax: [phone]    Encounter Date: 11/11/2019    Admit to Home Health    Diagnoses:  Active Hospital Problems    Diagnosis  POA    *Acute on chronic respiratory failure with hypoxia and hypercapnia [J96.21, J96.22]  Yes    Moderate protein-calorie malnutrition [E44.0]  Yes    Dependence on continuous supplemental oxygen [Z99.81]  Not Applicable    Pulmonary hypertension, WHO Class III [I27.20]  Yes    Depression [F32.9]  Yes    COPD with acute exacerbation [J44.1]  Yes    Essential hypertension [I10]  Yes      Resolved Hospital Problems    Diagnosis Date Resolved POA    Recurrent major depressive disorder [F33.9] 11/10/2019 Yes       No future appointments.        I have seen and examined this patient face to face today. My clinical findings that support the need for the home health skilled services and home bound status are the following:  Weakness/numbness causing balance and gait disturbance due to COPD Exacerbation making it taxing to leave home.  Patient with medication mismanagement issues requiring home bound status as evidenced by  Poor adherence to medication regimen/dosage.    Allergies:Review of patient's allergies indicates:  No Known Allergies    Diet: soft diet    Activities: activity as tolerated    Nursing:   SN to complete comprehensive assessment including routine vital signs. Instruct on disease process and s/s of complications to report to MD. Review/verify medication list sent home with the patient at time of discharge  and instruct patient/caregiver as needed. Frequency may be adjusted depending on start of care date.    Notify MD if SBP > 160 or < 90; DBP > 90 or < 50; HR > 120 or < 50; Temp > 101; Other:       Please  ensure patient uses BiPAP at night. See settings below:   Inspiratory pressure- 15, Expiratory pressure- 5, FiO2 ~40%    CONSULTS:    Physical Therapy to evaluate and treat. Evaluate for home safety and equipment needs; Establish/upgrade home exercise program. Perform / instruct on therapeutic exercises, gait training, transfer training, and Range of Motion.  Occupational Therapy to evaluate and treat. Evaluate home environment for safety and equipment needs. Perform/Instruct on transfers, ADL training, ROM, and therapeutic exercises.    MISCELLANEOUS CARE:  Home Oxygen:  Oxygen at 2 L/min nasal canula to be used:  Continuously.  COPD: Teach patient MDI/HFA usage and guidelines  Please provide smoking education, cessation, and nicotine replacement options if patient is a current smoker or if anyone in the household is a smoker  Please ensure patient has a functioning nebulizer and provide education on its usage.    Please ensure patient uses BiPAP at night. See BiPAP settings below:   Inspiratory pressure- 15, Expiratory pressure- 5, FiO2 ~40%  WOUND CARE ORDERS  n/a      Medications: Review discharge medications with patient and family and provide education.      Current Discharge Medication List      START taking these medications    Details   azithromycin (ZITHROMAX) 500 MG tablet Take 1 tablet (500 mg total) by mouth every evening. for 1 dose  Qty: 2 tablet, Refills: 0      guaiFENesin (MUCINEX) 600 mg 12 hr tablet Take 1 tablet (600 mg total) by mouth 2 (two) times daily. for 10 days  Qty: 20 tablet, Refills: 0      predniSONE (DELTASONE) 20 MG tablet Take 2 tablets (40 mg total) by mouth once daily for 1 dose  Qty: 2 tablet, Refills: 0         CONTINUE these medications which have NOT CHANGED    Details   albuterol (PROVENTIL) 2.5 mg /3 mL (0.083 %) nebulizer solution Take 2.5 mg by nebulization every 6 (six) hours as needed for Wheezing. Rescue      albuterol (VENTOLIN HFA) 90 mcg/actuation inhaler Inhale 2  puffs into the lungs every 6 (six) hours as needed for Wheezing. Rescue      ALPRAZolam (XANAX) 0.5 MG tablet Take 0.5 mg by mouth 2 (two) times daily as needed for Anxiety.      beclomethasone (QVAR) 80 mcg/actuation Aero Inhale 1 puff into the lungs 2 (two) times daily. Controller      cyproheptadine (PERIACTIN) 4 mg tablet Take 4 mg by mouth 2 (two) times daily.      escitalopram oxalate (LEXAPRO) 20 MG tablet Take 20 mg by mouth once daily.      furosemide (LASIX) 40 MG tablet Take 1/2 tablet by mouth on Monday,Wed. And Friday      glycopyrrolate-formoterol (BEVESPI AEROSPHERE) 9-4.8 mcg HFAA Inhale 2 puffs into the lungs 2 (two) times daily. Controller       HYDROcodone-acetaminophen (NORCO) 7.5-325 mg per tablet Take 1/2 tablet by mouth twice daily    Comments: Quantity prescribed more than 7 day supply? Press F2 and select one:[id number]        butalbital-acetaminop-caf-cod -00-30 mg Cap Take 1 capsule by mouth daily as needed (headaches).    Comments: Quantity prescribed more than 7 day supply? Press F2 and select one:51369           STOP taking these medications       amoxicillin-clavulanate 875-125mg (AUGMENTIN) 875-125 mg per tablet Comments:   Reason for Stopping:         carvedilol (COREG) 3.125 MG tablet Comments:   Reason for Stopping:         losartan (COZAAR) 100 MG tablet Comments:   Reason for Stopping:               I certify that this patient is confined to her home and needs intermittent skilled nursing care, physical therapy and occupational therapy.

## 2019-11-11 NOTE — ASSESSMENT & PLAN NOTE
Patient presenting with hypercapnic respiratory failure likely 2/2 to COPD exacerbation likely due to medication non-adherence. Daughter stated patient hasn't been keeping up with her maintenance therapy.     CXR demonstrated Pulmonary emphysema without detrimental change or radiographic acute intrathoracic process seen.  Specifically, no focal consolidation.    On presentation, VBG initially pH 7.264 with CO2 114 and HCO3 52. 7.344/88/48 on BiPAP.  ABG on room air -pH 7.4, CO2- 62, O2 sat- 78,   Resp infection panel- negative    Plan:  -Medically ready for discharge. Awaiting authorization for home BiPAP prior to discharge  -BiPAP qHS  -Wean O2 as tolerated, with O2 goals of 88-92%.   - Continue azithro x5days  - Continue steroids x5 days  -Will require BiPAP qHS at home given her severe COPD.   -Updated her daughter with the plan of treatment and importance of BiPAP at home.

## 2019-11-11 NOTE — PLAN OF CARE
Problem: Fall Injury Risk  Goal: Absence of Fall and Fall-Related Injury  Outcome: Ongoing, Not Progressing     Problem: Adult Inpatient Plan of Care  Goal: Absence of Hospital-Acquired Illness or Injury  Outcome: Ongoing, Not Progressing     Problem: Adult Inpatient Plan of Care  Goal: Rounds/Family Conference  Outcome: Ongoing, Not Progressing   No acute events throughout night. Pt able to express needs. Family at the bedside.  Patient withdrawn.  Quiet.  UP to bedside commode with assistance. Unsteady gait.   C/o pain  .  Analgesic given as ordered. Safety maintained.  Bed in low position,  call  light in reach.    Will continue to monitor

## 2019-11-11 NOTE — PLAN OF CARE
Pt goals remain appropriate, continue w/ OT POC.    Problem: Occupational Therapy Goal  Goal: Occupational Therapy Goal  Description  Goals to be met by: 11/6/2019     Patient will increase functional independence with ADLs by performing:    UE Dressing with [place].  LE Dressing with [place].  Grooming while standing at sink with Modified [place].  Toileting from toilet with Modified [place] for hygiene and clothing management.   Bathing from standing at sink with Modified [place].  Toilet transfer to toilet with Modified [place].      11/11/2019 1654 by Ajay Mahan, OT  Outcome: Ongoing, Progressing

## 2019-11-12 VITALS
RESPIRATION RATE: 18 BRPM | OXYGEN SATURATION: 97 % | TEMPERATURE: 98 F | BODY MASS INDEX: 18.31 KG/M2 | HEIGHT: 61 IN | DIASTOLIC BLOOD PRESSURE: 91 MMHG | HEART RATE: 117 BPM | WEIGHT: 97 LBS | SYSTOLIC BLOOD PRESSURE: 138 MMHG

## 2019-11-12 PROBLEM — J96.22 ACUTE ON CHRONIC RESPIRATORY FAILURE WITH HYPOXIA AND HYPERCAPNIA: Status: RESOLVED | Noted: 2018-02-05 | Resolved: 2019-11-12

## 2019-11-12 PROBLEM — J96.21 ACUTE ON CHRONIC RESPIRATORY FAILURE WITH HYPOXIA AND HYPERCAPNIA: Status: RESOLVED | Noted: 2018-02-05 | Resolved: 2019-11-12

## 2019-11-12 LAB
BACTERIA BLD CULT: NORMAL
BACTERIA BLD CULT: NORMAL
MAGNESIUM SERPL-MCNC: 1.8 MG/DL (ref 1.6–2.6)
PHOSPHATE SERPL-MCNC: 3.9 MG/DL (ref 2.7–4.5)

## 2019-11-12 PROCEDURE — 25000003 PHARM REV CODE 250: Performed by: STUDENT IN AN ORGANIZED HEALTH CARE EDUCATION/TRAINING PROGRAM

## 2019-11-12 PROCEDURE — 25000003 PHARM REV CODE 250: Performed by: HOSPITALIST

## 2019-11-12 PROCEDURE — 27000221 HC OXYGEN, UP TO 24 HOURS

## 2019-11-12 PROCEDURE — 97530 THERAPEUTIC ACTIVITIES: CPT

## 2019-11-12 PROCEDURE — 97116 GAIT TRAINING THERAPY: CPT

## 2019-11-12 PROCEDURE — 99239 HOSP IP/OBS DSCHRG MGMT >30: CPT | Mod: ,,, | Performed by: HOSPITALIST

## 2019-11-12 PROCEDURE — 94640 AIRWAY INHALATION TREATMENT: CPT

## 2019-11-12 PROCEDURE — 94664 DEMO&/EVAL PT USE INHALER: CPT

## 2019-11-12 PROCEDURE — 99239 PR HOSPITAL DISCHARGE DAY,>30 MIN: ICD-10-PCS | Mod: ,,, | Performed by: HOSPITALIST

## 2019-11-12 PROCEDURE — 63600175 PHARM REV CODE 636 W HCPCS: Performed by: STUDENT IN AN ORGANIZED HEALTH CARE EDUCATION/TRAINING PROGRAM

## 2019-11-12 PROCEDURE — 99900035 HC TECH TIME PER 15 MIN (STAT)

## 2019-11-12 PROCEDURE — 36415 COLL VENOUS BLD VENIPUNCTURE: CPT

## 2019-11-12 PROCEDURE — 84100 ASSAY OF PHOSPHORUS: CPT

## 2019-11-12 PROCEDURE — 83735 ASSAY OF MAGNESIUM: CPT

## 2019-11-12 PROCEDURE — 25000242 PHARM REV CODE 250 ALT 637 W/ HCPCS: Performed by: STUDENT IN AN ORGANIZED HEALTH CARE EDUCATION/TRAINING PROGRAM

## 2019-11-12 PROCEDURE — 94761 N-INVAS EAR/PLS OXIMETRY MLT: CPT

## 2019-11-12 RX ORDER — METOPROLOL TARTRATE 25 MG/1
25 TABLET, FILM COATED ORAL 2 TIMES DAILY
Qty: 60 TABLET | Refills: 11 | Status: SHIPPED | OUTPATIENT
Start: 2019-11-12 | End: 2019-11-12 | Stop reason: HOSPADM

## 2019-11-12 RX ORDER — CARVEDILOL 3.12 MG/1
3.12 TABLET ORAL 2 TIMES DAILY
Status: DISCONTINUED | OUTPATIENT
Start: 2019-11-12 | End: 2019-11-12 | Stop reason: HOSPADM

## 2019-11-12 RX ORDER — METOPROLOL TARTRATE 25 MG/1
25 TABLET, FILM COATED ORAL 2 TIMES DAILY
Status: DISCONTINUED | OUTPATIENT
Start: 2019-11-12 | End: 2019-11-12

## 2019-11-12 RX ORDER — ESCITALOPRAM OXALATE 10 MG/1
30 TABLET ORAL DAILY
Qty: 270 TABLET | Refills: 0 | Status: SHIPPED | OUTPATIENT
Start: 2019-11-13 | End: 2019-11-12 | Stop reason: HOSPADM

## 2019-11-12 RX ORDER — ESCITALOPRAM OXALATE 20 MG/1
30 TABLET ORAL DAILY
Qty: 270 TABLET | Refills: 0 | Status: SHIPPED | OUTPATIENT
Start: 2019-11-12

## 2019-11-12 RX ORDER — CARVEDILOL 3.12 MG/1
3.12 TABLET ORAL 2 TIMES DAILY
Qty: 180 TABLET | Refills: 2 | Status: SHIPPED | OUTPATIENT
Start: 2019-11-12 | End: 2020-11-11

## 2019-11-12 RX ORDER — IPRATROPIUM BROMIDE AND ALBUTEROL SULFATE 2.5; .5 MG/3ML; MG/3ML
3 SOLUTION RESPIRATORY (INHALATION) EVERY 6 HOURS PRN
Status: DISCONTINUED | OUTPATIENT
Start: 2019-11-12 | End: 2019-11-12 | Stop reason: HOSPADM

## 2019-11-12 RX ADMIN — TIOTROPIUM BROMIDE 18 MCG: 18 CAPSULE ORAL; RESPIRATORY (INHALATION) at 09:11

## 2019-11-12 RX ADMIN — GUAIFENESIN 600 MG: 600 TABLET, EXTENDED RELEASE ORAL at 09:11

## 2019-11-12 RX ADMIN — IPRATROPIUM BROMIDE AND ALBUTEROL SULFATE 3 ML: .5; 3 SOLUTION RESPIRATORY (INHALATION) at 07:11

## 2019-11-12 RX ADMIN — METOPROLOL TARTRATE 25 MG: 25 TABLET ORAL at 03:11

## 2019-11-12 RX ADMIN — ESCITALOPRAM OXALATE 20 MG: 20 TABLET ORAL at 09:11

## 2019-11-12 RX ADMIN — PREDNISONE 40 MG: 10 TABLET ORAL at 09:11

## 2019-11-12 RX ADMIN — SENNOSIDES AND DOCUSATE SODIUM 1 TABLET: 8.6; 5 TABLET ORAL at 09:11

## 2019-11-12 RX ADMIN — FUROSEMIDE 40 MG: 40 TABLET ORAL at 09:11

## 2019-11-12 RX ADMIN — FLUTICASONE FUROATE AND VILANTEROL TRIFENATATE 1 PUFF: 100; 25 POWDER RESPIRATORY (INHALATION) at 09:11

## 2019-11-12 NOTE — PT/OT/SLP PROGRESS
Physical Therapy Treatment    Patient Name:  Sonya Buckner   MRN:  [id number]    Recommendations:     Discharge Recommendations:  home with home health   Discharge Equipment Recommendations: none   Barriers to discharge: Inaccessible home and Decreased caregiver support    Assessment:     Sonya Buckner is a 64 y.o. female admitted with a medical diagnosis of Acute on chronic respiratory failure with hypoxia and hypercapnia.  She presents with the following impairments/functional limitations:  weakness, impaired endurance, impaired self care skills, impaired functional mobilty, gait instability, impaired balance, decreased upper extremity function, decreased lower extremity function, pain, decreased ROM, impaired cardiopulmonary response to activity, decreased safety awareness. Pt tolerated session well with focus on bed mobility, transfers, and gait training. Pt independent with bed mobility and mobilizes OOB with CGA and use of RW. Pt will continue to benefit from therapy services to address impairments listed above.     Rehab Prognosis: Good; patient would benefit from acute skilled PT services to address these deficits and reach maximum level of function.    Recent Surgery: * No surgery found *      Plan:     During this hospitalization, patient to be seen 3 x/week to address the identified rehab impairments via gait training, therapeutic activities, therapeutic exercises, neuromuscular re-education and progress toward the following goals:    · Plan of Care Expires:  12/03/19    Subjective     Chief Complaint: no c/o  Pain/Comfort:  · Pain Rating 1: 0/10  · Pain Rating Post-Intervention 1: 0/10      Objective:     Communicated with NSG prior to session.  Patient found sitting at EOB with (no lines attached) upon PTA entry to room. Son present.     General Precautions: Standard, fall, respiratory   Orthopedic Precautions:N/A   Braces: N/A     Functional Mobility:  · Bed Mobility:     · Supine  to Sit: independence  · Sit to Supine: independence  · Transfers:     · Sit to Stand:  contact guard assistance with rolling walker  · Gait: Pt ambulated 156 ft with CGA and HHA with several lateral LOB to both L/R. Pt provided RW and repeats same distance with less episodes of LOB and improved stability. Narrow IGNACIO, lateral instability L > R.       AM-PAC 6 CLICK MOBILITY  Turning over in bed (including adjusting bedclothes, sheets and blankets)?: 4  Sitting down on and standing up from a chair with arms (e.g., wheelchair, bedside commode, etc.): 3  Moving from lying on back to sitting on the side of the bed?: 4  Moving to and from a bed to a chair (including a wheelchair)?: 3  Need to walk in hospital room?: 3  Climbing 3-5 steps with a railing?: 2  Basic Mobility Total Score: 19       Therapeutic Activities and Exercises:  Pt assisted with functional mobility as noted above.   Pt and son educated on RW use and PT POC/disposition.     Patient left sitting EOB with call button in reach, NSG notified and son present.    GOALS:   Multidisciplinary Problems     Physical Therapy Goals        Problem: Physical Therapy Goal    Goal Priority Disciplines Outcome Goal Variances Interventions   Physical Therapy Goal     PT, PT/OT Ongoing, Progressing     Description:  Goals to be met by: 2019     Patient will increase functional independence with mobility by performin. Supine to sit with Modified [place] - met  2. Sit to supine with Modified [place] - met  3. Sit to stand transfer with Supervision  4. Gait  x 75 feet with Stand-by Assistance using LRAD                       Time Tracking:     PT Received On: 19  PT Start Time: 1055     PT Stop Time: 1122  PT Total Time (min): 27 min     Billable Minutes: Gait Training 15 and Therapeutic Activity 12    Treatment Type: Treatment  PT/PTA: PTA     PTA Visit Number: 1     Beny Barber, PTA  2019

## 2019-11-12 NOTE — PLAN OF CARE
2 goals met. Goals remain appropriate.     Problem: Physical Therapy Goal  Goal: Physical Therapy Goal  Description  Goals to be met by: 2019     Patient will increase functional independence with mobility by performin. Supine to sit with Modified Cannon - met  2. Sit to supine with Modified [place] - met  3. Sit to stand transfer with Supervision  4. Gait  x 75 feet with Stand-by Assistance using LRAD      Outcome: Ongoing, Progressing

## 2019-11-12 NOTE — PLAN OF CARE
Plan is to discharge home with bipap.       11/12/19 1017   Discharge Reassessment   Assessment Type Discharge Planning Reassessment   Do you have any problems affording any of your prescribed medications? No   Discharge Plan A Home   Discharge Plan B Home with family   DME Needed Upon Discharge  BIPAP   Anticipated Discharge Disposition Home   Post-Acute Status   Post-Acute Authorization Other   Other Status No Post-Acute Service Needs

## 2019-11-12 NOTE — ASSESSMENT & PLAN NOTE
Holding Home meds: carvedilol 3.125 bid, lasix 40 po, losartan 100  -BP stable held during hospital course.   BP transiently elevated prior to discharge   -continue home correg and losartan

## 2019-11-13 NOTE — PLAN OF CARE
Patient discharged home with Bipap from [facility].    CM called Dr. Will Monaco twice to make follow up appointment, left message both times with no return phone call.  Second message included patient's phone number for them to call her.       11/13/19 6358   Final Note   Assessment Type Final Discharge Note   Anticipated Discharge Disposition Home   Right Care Referral Info   Post Acute Recommendation No Care

## 2019-11-13 NOTE — DISCHARGE SUMMARY
[facility] Hospital Medicine  Discharge Summary      Patient Name: Sonya Buckner  MRN: [id number]  Admission Date: 11/7/2019  Hospital Length of Stay: 5 days  Discharge Date and Time:  11/12/2019 7:29 PM  Attending Physician: Cam Brian MD   Discharging Provider: Delfina Juarez MD  Primary Care Provider: Will Monaco MD  Hospital Medicine Team: [facility] HOSP MED 1 Delfina Juarez MD    HPI:   Ms Heredia is a 65 yo F with COPD, HFpEF (EF 60% 2018), asthma, and htn who presented to the ED on 11/8 in acute respiratory distress. Family is present with patient and states that she has been increasingly altered over the past 3 weeks but that it has been more pronounced today. They report that the patient is conversational at baseline but has been more combative and altered lately. She is on 2-3L NC at home but report that she desaturated to the 60s on 2L at home which prompted them to bring her to the hospital. Of note, patient presented to [place] one week ago with similar symptoms. Patient is also complaining of RLQ abdominal pain that has been present over the same timeline as well.    On examination in ED, patient was lethargic but alert and oriented. She was on CPAP satting 99% in ED and then on BiPaP. Patient denies any fever, chills, dyspnea, BV, dysuria, hematuria. Patient is a current and long time smoker, down to 1PPW. Family denies any drinking or drug use other than remote marijuana use.        * No surgery found *      Hospital Course:   Patient was admitted for acute on chronic hypoxic hypercapnic resp failure 2/2 to COPD exacerbation in the setting of medication non-compliance. Received IV solumedrol and placed on BiPAP in ED. She was  treated with duonebs q4hrs, a 5 day course of azithromycin and PO steroids, as well as  BiPAP qHS. Respiratory infection panel was negative. Given her ABG on room air- CO2- 62, pH 7.4, O2 saturation 78, patient will require BiPAP nightly at home.  Performed Abd US to evaluate for abdominal pain which was unrevealing for hernia or abnormality. Wet prep for vaginal irritation and discharge was unrevealing for organisms. Patient lives with her daughter who has been updated with the treatment plan. For Pulm HTN WHO class III- repeat TTE shows PA systolic pressure 32, which has improved from 62 on 02/2018. Patient appeared withdrawn and depressed throughout hospitalization. Increased escitalopram to 30mg daily, will recommend treatment with mirtazapine and close follow up outpatient. Encouraged daughter to assist patient with medication adherence and BiPAP use at night. Continue losartan and coreg for hypertension. Coreg also helps with essential tremors noted during hospital course. Patient will be discharged home with BiPAP, to follow up with sleep study outpatient and PT/OT    Vitals:    11/12/19 1540   BP: (!) 138/91   Pulse: (!) 117   Resp: 18   Temp:      Physical Exam   Constitutional: She is oriented to person, place, and time. She appears well-developed and well-nourished. No distress.   HENT:   Head: Normocephalic and atraumatic.   Eyes: Conjunctivae and EOM are normal. No scleral icterus.   Neck: Normal range of motion. No JVD present.   Cardiovascular: Normal rate, regular rhythm, normal heart sounds and intact distal pulses. Exam reveals no friction rub.   No murmur heard.  Pulmonary/Chest: Effort normal and breath sounds normal. No stridor. No respiratory distress. She has no wheezes. She has no rales.   Abdominal: Soft. She exhibits no distension. There is  mild tenderness (mild tenderness in bladder region). There is no rebound and no guarding.   Musculoskeletal: Normal range of motion. She exhibits no edema.   Neurological: She is alert and oriented to person, place, and time. No cranial nerve deficit.   Skin: Skin is warm and dry. She is not diaphoretic.   Psychiatric:   Withdrawn   Nursing note and vitals reviewed.        Consults:     * Acute  on chronic respiratory failure with hypoxia and hypercapnia-resolved as of 11/12/2019  Patient presenting with hypercapnic respiratory failure likely 2/2 to COPD exacerbation likely due to medication non-adherence. Daughter stated patient hasn't been keeping up with her maintenance therapy.     CXR demonstrated Pulmonary emphysema without detrimental change or radiographic acute intrathoracic process seen.  Specifically, no focal consolidation.    On presentation, VBG initially pH 7.264 with CO2 114 and HCO3 52. 7.344/88/48 on BiPAP.  ABG on room air -pH 7.4, CO2- 62, O2 sat- 78,   Resp infection panel- negative    Plan:  -Medically ready for discharge. Awaiting authorization for home BiPAP prior to discharge  -BiPAP qHS  -Wean O2 as tolerated, with O2 goals of 88-92%.   - Continue azithro x5days  - Continue steroids x5 days  -Will require BiPAP qHS at home given her severe COPD.   -Updated her daughter with the plan of treatment and importance of BiPAP at home.      Depression  -Increased home lexapro to 30 mg  Recommend mirtazapine therapy outpatient     Pulmonary hypertension, WHO Class III  PA pressure of 67, severe  Repeat echo with PA pressure of 32.       Essential hypertension  Holding Home meds: carvedilol 3.125 bid, lasix 40 po, losartan 100  -BP stable held during hospital course.   BP transiently elevated prior to discharge   -continue home correg and losartan       Final Active Diagnoses:    Diagnosis Date Noted POA    Moderate protein-calorie malnutrition [E44.0] 11/11/2019 Yes    Dependence on continuous supplemental oxygen [Z99.81] 11/11/2019 Not Applicable    Pulmonary hypertension, WHO Class III [I27.20] 11/10/2019 Yes    Depression [F32.9] 11/10/2019 Yes    COPD with acute exacerbation [J44.1] 02/05/2018 Yes    Essential hypertension [I10] 02/05/2018 Yes      Problems Resolved During this Admission:    Diagnosis Date Noted Date Resolved POA    PRINCIPAL PROBLEM:  Acute on chronic  respiratory failure with hypoxia and hypercapnia [J96.21, J96.22] 02/05/2018 11/12/2019 Yes    Recurrent major depressive disorder [F33.9] 11/10/2019 11/10/2019 Yes       Discharged Condition: stable    Disposition:     Follow Up:    Patient Instructions:      BIPAP FOR HOME USE     Order Specific Question Answer Comments   Type: BiPap    BiPap setting (cmH20) Inspiratory: 15    BiPap setting (cmH20) Expiratory: 5    Length of need (1-99 months): 99    Humidification: Heated    Type of mask: FFM    Accessories needed: Headgear    Accessories needed: Tubing    Accessories needed: Humidifier chamber    Accessories needed: Filters    Accessories needed: Cushions    Accessories needed: Chin strap    BiPap supply refills: 12      Ambulatory referral to Sleep Disorders   Referral Priority: Routine Referral Type: Consultation   Requested Specialty: Sleep Medicine   Number of Visits Requested: 1     Ambulatory Referral to Physical/Occupational Therapy   Referral Priority: Routine Referral Type: Physical Medicine   Referral Reason: Specialty Services Required   Number of Visits Requested: 1       Significant Diagnostic Studies: Labs:   BMP:   Recent Labs   Lab 11/11/19  0702 11/12/19  0655   GLU 93  --      --    K 3.9  --    CL 93*  --    CO2 39*  --    BUN 17  --    CREATININE 0.8  --    CALCIUM 9.1  --    MG 1.9 1.8   , CMP   Recent Labs   Lab 11/11/19  0702      K 3.9   CL 93*   CO2 39*   GLU 93   BUN 17   CREATININE 0.8   CALCIUM 9.1   ANIONGAP 8   ESTGFRAFRICA >60.0   EGFRNONAA >60.0    and CBC   Recent Labs   Lab 11/11/19  0702   WBC 6.58   HGB 11.3*   HCT 35.7*          Pending Diagnostic Studies:     None         Medications:  Reconciled Home Medications:      Medication List      START taking these medications    guaiFENesin 600 mg 12 hr tablet  Commonly known as:  MUCINEX  Take 1 tablet (600 mg total) by mouth 2 (two) times daily. for 10 days        CHANGE how you take these medications    *  escitalopram oxalate 20 MG tablet  Commonly known as:  LEXAPRO  Take 1 tablet by mouth once daily in addition to 10 mg for a total dose of 30 mg  What changed:  how much to take     * escitalopram oxalate 10 MG tablet  Commonly known as:  LEXAPRO  Take 1 tablet by mouth once daily in addition to 20 mg for a total dose of 30 mg  What changed:  You were already taking a medication with the same name, and this prescription was added. Make sure you understand how and when to take each.         * This list has 2 medication(s) that are the same as other medications prescribed for you. Read the directions carefully, and ask your doctor or other care provider to review them with you.            CONTINUE taking these medications    * albuterol 2.5 mg /3 mL (0.083 %) nebulizer solution  Commonly known as:  PROVENTIL  Take 2.5 mg by nebulization every 6 (six) hours as needed for Wheezing. Rescue     * VENTOLIN HFA 90 mcg/actuation inhaler  Generic drug:  albuterol  Inhale 2 puffs into the lungs every 6 (six) hours as needed for Wheezing. Rescue     ALPRAZolam 0.5 MG tablet  Commonly known as:  XANAX  Take 0.5 mg by mouth 2 (two) times daily as needed for Anxiety.     beclomethasone 80 mcg/actuation Aero  Commonly known as:  QVAR  Inhale 1 puff into the lungs 2 (two) times daily. Controller     butalbital-acetaminop-caf-cod -61-30 mg Cap  Take 1 capsule by mouth daily as needed (headaches).     carvedilol 3.125 MG tablet  Commonly known as:  COREG  Take 1 tablet (3.125 mg total) by mouth 2 (two) times daily.     cyproheptadine 4 mg tablet  Commonly known as:  PERIACTIN  Take 4 mg by mouth 2 (two) times daily.     furosemide 40 MG tablet  Commonly known as:  LASIX  Take 1/2 tablet by mouth on Monday,Wed. And Friday     glycopyrrolate-formoterol 9-4.8 mcg Hfaa  Commonly known as:  BEVESPI AEROSPHERE  Inhale 2 puffs into the lungs 2 (two) times daily. Controller     HYDROcodone-acetaminophen 7.5-325 mg per tablet  Commonly  known as:  ANTOINETTE  Take 1/2 tablet by mouth twice daily         * This list has 2 medication(s) that are the same as other medications prescribed for you. Read the directions carefully, and ask your doctor or other care provider to review them with you.            STOP taking these medications    amoxicillin-clavulanate 875-125mg 875-125 mg per tablet  Commonly known as:  AUGMENTIN     losartan 100 MG tablet  Commonly known as:  COZAAR            Indwelling Lines/Drains at time of discharge:   Lines/Drains/Airways     None                 Time spent on the discharge of patient: 35 minutes  Patient was seen and examined on the date of discharge and determined to be suitable for discharge.         Delfina Juarez MD  Department of Hospital Medicine  [facility]

## 2019-11-13 NOTE — PLAN OF CARE
Problem: Fall Injury Risk  Goal: Absence of Fall and Fall-Related Injury  Outcome: Met     Problem: Adult Inpatient Plan of Care  Goal: Plan of Care Review  Outcome: Met  Goal: Patient-Specific Goal (Individualization)  Outcome: Met  Goal: Absence of Hospital-Acquired Illness or Injury  Outcome: Met  Goal: Optimal Comfort and Wellbeing  Outcome: Met  Goal: Readiness for Transition of Care  Outcome: Met  Goal: Rounds/Family Conference  Outcome: Met     Problem: Skin Injury Risk Increased  Goal: Skin Health and Integrity  Outcome: Met

## 2019-11-13 NOTE — PROGRESS NOTES
Pt discharged home. Awaiting completion of bipap teaching by RT. Son to transport home by personal transportation; to accompany to lobby via w/c. All belongings obtained. Educated on discharge and summary provided; pt and responsible party voiced understanding.

## 2019-11-13 NOTE — PHYSICIAN QUERY
"PT Name: Sonya Buckner  MR #: [id number]    Physician Query Form - Heart  Condition Clarification     CDS/: Jigna Duran RN              Contact information:[email or web address]  This form is a permanent document in the medical record.     Query Date: November 13, 2019    By submitting this query, we are merely seeking further clarification of documentation. Please utilize your independent clinical judgment when addressing the question(s) below.    The medical record contains the following   Indicators     Supporting Clinical Findings Location in Medical Record   X BNP BNP 41 Labs 11/7   X EF · The estimated ejection fraction is 65%.   TTE 11/11   X Radiology findings Pulmonary emphysema without detrimental change or radiographic acute intrathoracic process seen.  Specifically, no focal consolidation. CXR 11/7   X Echo Results · Normal left ventricular systolic function. The estimated ejection fraction is 65%.  · Normal right ventricular systolic function.  · Normal LV diastolic function.  · The estimated PA systolic pressure is 32 mm Hg  · Normal central venous pressure (3 mm Hg). TTE 11/11    "Ascites" documented     X "SOB" or "BELLE" documented Positive for shortness of breath ED Provider    "Hypoxia" documented     X Heart Failure documented CHF EF 65% diastolic dysfunction. ED Provider   X "Edema" documented No lower extremity edema ED Provider   X Diuretics/Meds Coreg 3.125 mg PO BID  Lasix 40mg PO daily  Lopressor 25mg PO BID MAR   X Treatment: TTE  EKG 12 lead MD orders 11/11  MD orders 11/7    Other:      Heart failure (HF) can be acute, chronic or both. It is generally further specificed as systolic, diastolic, or combined. Lastly, it is important to identify an underlying etiology if known or suspected.     Common clues to acute exacerbation:  Rapidly progressive symptoms (w/in 2 weeks of presentation), using IV diuretics to treat, using supplemental O2, pulmonary edema on Xray, MI " w/in 4 weeks, and/or BNP >500    Systolic Heart Failure: is defined as chart documentation of a left ventricular ejection fraction (LVEF) less than 40%     Diastolic Heart Failure: is defined as a left ventricular ejection fraction (LVEF) greater than 40%   +      Evidence of diastolic dysfunction on echocardiography OR    Right heart catheterization wedge pressure above 12 mm Hg OR    Left heart catheterization left ventricular end diastolic pressure 18 mm Hg or above.    References: *American Heart Association    The clinical guidelines noted below are only system guidelines, and do not replace the providers clinical judgment.     Provider, please specify the diagnosis associated with above clinical findings  [   ] Chronic Diastolic Heart Failure - Pre-existing diastolic HF diagnosis.  EF > 40%  without  acute HF symptoms documented   [   ] Other Type of Heart Failure (please specify type):   [ x  ] Heart Failure Ruled Out   [   ] Other (please specify):   [ x ] Chronic diastolic heart failure ruled out                           Please document in your progress notes daily for the duration of treatment until resolved and include in your discharge summary.

## 2022-05-06 NOTE — HOSPITAL COURSE
Patient was admitted for acute on chronic hypoxic hypercapnic resp failure 2/2 to COPD exacerbation in the setting of medication non-compliance. Received IV solumedrol and placed on BiPAP in ED. She was  treated with duonebs q4hrs, a 5 day course of azithromycin and PO steroids, as well as  BiPAP qHS. Respiratory infection panel was negative. Given her ABG on room air- CO2- 62, pH 7.4, O2 saturation 78, patient will require BiPAP nightly at home. Performed Abd US to evaluate for abdominal pain which was unrevealing for hernia or abnormality. Wet prep for vaginal irritation and discharge was unrevealing for organisms. Patient lives with her daughter who has been updated with the treatment plan. For Pulm HTN WHO class III- repeat TTE shows PA systolic pressure 32, which has improved from 62 on 02/2018. Patient appeared withdrawn and depressed throughout hospitalization. Increased escitalopram to 30mg daily, will recommend treatment with mirtazapine and close follow up outpatient. Encouraged daughter to assist patient with medication adherence and BiPAP use at night. Continue losartan and coreg for hypertension. Coreg also helps with essential tremors noted during hospital course. Patient will be discharged home with BiPAP, to follow up with sleep study outpatient and PT/OT    Vitals:    11/12/19 1540   BP: (!) 138/91   Pulse: (!) 117   Resp: 18   Temp:      Physical Exam   Constitutional: She is oriented to person, place, and time. She appears well-developed and well-nourished. No distress.   HENT:   Head: Normocephalic and atraumatic.   Eyes: Conjunctivae and EOM are normal. No scleral icterus.   Neck: Normal range of motion. No JVD present.   Cardiovascular: Normal rate, regular rhythm, normal heart sounds and intact distal pulses. Exam reveals no friction rub.   No murmur heard.  Pulmonary/Chest: Effort normal and breath sounds normal. No stridor. No respiratory distress. She has no wheezes. She has no rales.    Abdominal: Soft. She exhibits no distension. There is mild tenderness (mild tenderness in bladder region). There is no rebound and no guarding.   Musculoskeletal: Normal range of motion. She exhibits no edema.   Neurological: She is alert and oriented to person, place, and time. No cranial nerve deficit.   Skin: Skin is warm and dry. She is not diaphoretic.   Psychiatric:   Withdrawn   Nursing note and vitals reviewed.      Satisfactory